# Patient Record
Sex: FEMALE | Race: WHITE | NOT HISPANIC OR LATINO | Employment: UNEMPLOYED | URBAN - NONMETROPOLITAN AREA
[De-identification: names, ages, dates, MRNs, and addresses within clinical notes are randomized per-mention and may not be internally consistent; named-entity substitution may affect disease eponyms.]

---

## 2019-01-13 ENCOUNTER — OFFICE VISIT (OUTPATIENT)
Dept: RETAIL CLINIC | Facility: CLINIC | Age: 49
End: 2019-01-13

## 2019-01-13 VITALS
OXYGEN SATURATION: 98 % | RESPIRATION RATE: 16 BRPM | DIASTOLIC BLOOD PRESSURE: 76 MMHG | TEMPERATURE: 97.2 F | SYSTOLIC BLOOD PRESSURE: 140 MMHG | HEART RATE: 76 BPM

## 2019-01-13 DIAGNOSIS — N95.1 PERIMENOPAUSAL SYMPTOM: ICD-10-CM

## 2019-01-13 DIAGNOSIS — H10.31 ACUTE CONJUNCTIVITIS OF RIGHT EYE, UNSPECIFIED ACUTE CONJUNCTIVITIS TYPE: ICD-10-CM

## 2019-01-13 DIAGNOSIS — J01.40 ACUTE PANSINUSITIS, RECURRENCE NOT SPECIFIED: Primary | ICD-10-CM

## 2019-01-13 PROCEDURE — 99213 OFFICE O/P EST LOW 20 MIN: CPT | Performed by: NURSE PRACTITIONER

## 2019-01-13 RX ORDER — VENLAFAXINE HYDROCHLORIDE 75 MG/1
75 CAPSULE, EXTENDED RELEASE ORAL DAILY
COMMUNITY

## 2019-01-13 RX ORDER — POLYMYXIN B SULFATE AND TRIMETHOPRIM 1; 10000 MG/ML; [USP'U]/ML
1 SOLUTION OPHTHALMIC EVERY 4 HOURS
Qty: 10 ML | Refills: 0 | Status: SHIPPED | OUTPATIENT
Start: 2019-01-13 | End: 2019-04-23

## 2019-01-13 RX ORDER — AMOXICILLIN AND CLAVULANATE POTASSIUM 875; 125 MG/1; MG/1
1 TABLET, FILM COATED ORAL 2 TIMES DAILY
Qty: 20 TABLET | Refills: 0 | Status: SHIPPED | OUTPATIENT
Start: 2019-01-13 | End: 2019-01-23

## 2019-01-13 NOTE — PATIENT INSTRUCTIONS
Drink plenty of fluids and rest  Follow up if symptoms worsen or persist     Sinusitis, Adult  Sinusitis is soreness and inflammation of your sinuses. Sinuses are hollow spaces in the bones around your face. Your sinuses are located:  · Around your eyes.  · In the middle of your forehead.  · Behind your nose.  · In your cheekbones.    Your sinuses and nasal passages are lined with a stringy fluid (mucus). Mucus normally drains out of your sinuses. When your nasal tissues become inflamed or swollen, the mucus can become trapped or blocked so air cannot flow through your sinuses. This allows bacteria, viruses, and funguses to grow, which leads to infection.  Sinusitis can develop quickly and last for 7?10 days (acute) or for more than 12 weeks (chronic). Sinusitis often develops after a cold.  What are the causes?  This condition is caused by anything that creates swelling in the sinuses or stops mucus from draining, including:  · Allergies.  · Asthma.  · Bacterial or viral infection.  · Abnormally shaped bones between the nasal passages.  · Nasal growths that contain mucus (nasal polyps).  · Narrow sinus openings.  · Pollutants, such as chemicals or irritants in the air.  · A foreign object stuck in the nose.  · A fungal infection. This is rare.    What increases the risk?  The following factors may make you more likely to develop this condition:  · Having allergies or asthma.  · Having had a recent cold or respiratory tract infection.  · Having structural deformities or blockages in your nose or sinuses.  · Having a weak immune system.  · Doing a lot of swimming or diving.  · Overusing nasal sprays.  · Smoking.    What are the signs or symptoms?  The main symptoms of this condition are pain and a feeling of pressure around the affected sinuses. Other symptoms include:  · Upper toothache.  · Earache.  · Headache.  · Bad breath.  · Decreased sense of smell and taste.  · A cough that may get worse at  night.  · Fatigue.  · Fever.  · Thick drainage from your nose. The drainage is often green and it may contain pus (purulent).  · Stuffy nose or congestion.  · Postnasal drip. This is when extra mucus collects in the throat or back of the nose.  · Swelling and warmth over the affected sinuses.  · Sore throat.  · Sensitivity to light.    How is this diagnosed?  This condition is diagnosed based on symptoms, a medical history, and a physical exam. To find out if your condition is acute or chronic, your health care provider may:  · Look in your nose for signs of nasal polyps.  · Tap over the affected sinus to check for signs of infection.  · View the inside of your sinuses using an imaging device that has a light attached (endoscope).    If your health care provider suspects that you have chronic sinusitis, you may also:  · Be tested for allergies.  · Have a sample of mucus taken from your nose (nasal culture) and checked for bacteria.  · Have a mucus sample examined to see if your sinusitis is related to an allergy.    If your sinusitis does not respond to treatment and it lasts longer than 8 weeks, you may have an MRI or CT scan to check your sinuses. These scans also help to determine how severe your infection is.  In rare cases, a bone biopsy may be done to rule out more serious types of fungal sinus disease.  How is this treated?  Treatment for sinusitis depends on the cause and whether your condition is chronic or acute. If a virus is causing your sinusitis, your symptoms will go away on their own within 10 days. You may be given medicines to relieve your symptoms, including:  · Topical nasal decongestants. They shrink swollen nasal passages and let mucus drain from your sinuses.  · Antihistamines. These drugs block inflammation that is triggered by allergies. This can help to ease swelling in your nose and sinuses.  · Topical nasal corticosteroids. These are nasal sprays that ease inflammation and swelling in  your nose and sinuses.  · Nasal saline washes. These rinses can help to get rid of thick mucus in your nose.    If your condition is caused by bacteria, you will be given an antibiotic medicine. If your condition is caused by a fungus, you will be given an antifungal medicine.  Surgery may be needed to correct underlying conditions, such as narrow nasal passages. Surgery may also be needed to remove polyps.  Follow these instructions at home:  Medicines  · Take, use, or apply over-the-counter and prescription medicines only as told by your health care provider. These may include nasal sprays.  · If you were prescribed an antibiotic medicine, take it as told by your health care provider. Do not stop taking the antibiotic even if you start to feel better.  Hydrate and Humidify  · Drink enough water to keep your urine clear or pale yellow. Staying hydrated will help to thin your mucus.  · Use a cool mist humidifier to keep the humidity level in your home above 50%.  · Inhale steam for 10-15 minutes, 3-4 times a day or as told by your health care provider. You can do this in the bathroom while a hot shower is running.  · Limit your exposure to cool or dry air.  Rest  · Rest as much as possible.  · Sleep with your head raised (elevated).  · Make sure to get enough sleep each night.  General instructions  · Apply a warm, moist washcloth to your face 3-4 times a day or as told by your health care provider. This will help with discomfort.  · Wash your hands often with soap and water to reduce your exposure to viruses and other germs. If soap and water are not available, use hand .  · Do not smoke. Avoid being around people who are smoking (secondhand smoke).  · Keep all follow-up visits as told by your health care provider. This is important.  Contact a health care provider if:  · You have a fever.  · Your symptoms get worse.  · Your symptoms do not improve within 10 days.  Get help right away if:  · You have a  severe headache.  · You have persistent vomiting.  · You have pain or swelling around your face or eyes.  · You have vision problems.  · You develop confusion.  · Your neck is stiff.  · You have trouble breathing.  This information is not intended to replace advice given to you by your health care provider. Make sure you discuss any questions you have with your health care provider.  Document Released: 12/18/2006 Document Revised: 08/13/2017 Document Reviewed: 10/12/2016  [employer] Patient Education © 2018 [employer]

## 2019-01-13 NOTE — PROGRESS NOTES
Chief Complaint   Patient presents with   • Sinusitis   • Conjunctivitis     Subjective   Antoinette Acosta is a 49 y.o. female who presents to the clinic today with complaints sinus pressure and pain and some conjunctivitis. She has been sick for 5 days with no improvements in symptoms.  She had cellulitis of her right eye about 6 months ago. She woke up 5 days ago with greenish discharge. Her eye is not getting any better, but she does not feel like she has cellulitis and eye is not swollen.   Sinusitis   This is a new problem. The current episode started in the past 7 days. The problem has been gradually worsening since onset. There has been no fever. The pain is moderate. Associated symptoms include congestion, coughing, ear pain, headaches, sinus pressure and a sore throat. Pertinent negatives include no chills, diaphoresis, hoarse voice, neck pain, shortness of breath, sneezing or swollen glands.   Conjunctivitis    The current episode started 5 to 7 days ago. The onset was sudden. The problem has been gradually worsening. The problem is moderate. Nothing relieves the symptoms. Nothing aggravates the symptoms. Associated symptoms include eye itching, congestion, ear pain, headaches, sore throat, cough, eye discharge and eye redness. Pertinent negatives include no fever, no decreased vision, no double vision, no photophobia, no hearing loss, no mouth sores, no rhinorrhea, no stridor, no swollen glands, no neck pain, no wheezing and no eye pain.         Current Outpatient Medications:   •  venlafaxine XR (EFFEXOR-XR) 75 MG 24 hr capsule, Take 75 mg by mouth Daily., Disp: , Rfl:   •  amoxicillin-clavulanate (AUGMENTIN) 875-125 MG per tablet, Take 1 tablet by mouth 2 (Two) Times a Day for 10 days., Disp: 20 tablet, Rfl: 0  •  trimethoprim-polymyxin b (POLYTRIM) 87557-6.1 UNIT/ML-% ophthalmic solution, Administer 1 drop to the right eye Every 4 (Four) Hours., Disp: 10 mL, Rfl: 0    Allergies:  Sulfur and  Codeine    Past Medical History:   Diagnosis Date   • Allergic    • Anxiety    • Arthritis    • Hypertension    • Sinusitis      Past Surgical History:   Procedure Laterality Date   • CARPAL TUNNEL RELEASE Bilateral    •  SECTION     • RECONSTRUCTION OF NOSE       Family History   Problem Relation Age of Onset   • Lung disease Mother      Social History     Tobacco Use   • Smoking status: Current Every Day Smoker     Packs/day: 1.00     Years: 0.75     Pack years: 0.75     Types: Cigarettes   • Smokeless tobacco: Never Used   Substance Use Topics   • Alcohol use: Yes     Comment: 2 weekly    • Drug use: No       Review of Systems  Review of Systems   Constitutional: Positive for fatigue. Negative for chills, diaphoresis and fever.   HENT: Positive for congestion, ear pain, postnasal drip, sinus pressure, sinus pain and sore throat. Negative for hearing loss, hoarse voice, mouth sores, rhinorrhea and sneezing.    Eyes: Positive for discharge, redness and itching. Negative for double vision, photophobia and pain.   Respiratory: Positive for cough. Negative for shortness of breath, wheezing and stridor.    Musculoskeletal: Negative for neck pain.   Neurological: Positive for headaches. Negative for dizziness.   Hematological: Negative for adenopathy.       Objective   /76 (BP Location: Left arm, Patient Position: Sitting, Cuff Size: Adult)   Pulse 76   Temp 97.2 °F (36.2 °C) (Tympanic)   Resp 16   LMP 2019   SpO2 98%       Physical Exam   Constitutional: She is oriented to person, place, and time. She appears well-developed and well-nourished.   HENT:   Head: Normocephalic and atraumatic.   Right Ear: Hearing, tympanic membrane, external ear and ear canal normal.   Left Ear: Hearing, tympanic membrane, external ear and ear canal normal.   Nose: Mucosal edema and rhinorrhea present. Right sinus exhibits maxillary sinus tenderness and frontal sinus tenderness. Left sinus exhibits no maxillary  sinus tenderness and no frontal sinus tenderness.   Mouth/Throat: Uvula is midline and mucous membranes are normal. Posterior oropharyngeal erythema present. No oropharyngeal exudate, posterior oropharyngeal edema or tonsillar abscesses. Tonsils are 1+ on the right. Tonsils are 1+ on the left. No tonsillar exudate.   Eyes: EOM and lids are normal. Pupils are equal, round, and reactive to light. Right conjunctiva is injected. Right conjunctiva has no hemorrhage. Left conjunctiva is not injected. Left conjunctiva has no hemorrhage.   Neck: Normal range of motion. Neck supple.   Cardiovascular: Normal rate, regular rhythm and normal heart sounds. Exam reveals no gallop and no friction rub.   No murmur heard.  Pulmonary/Chest: Effort normal and breath sounds normal. No stridor. No respiratory distress. She has no wheezes. She has no rales. She exhibits no tenderness.   Lymphadenopathy:        Head (right side): No submental, no submandibular, no tonsillar, no preauricular, no posterior auricular and no occipital adenopathy present.        Head (left side): No submental, no submandibular, no tonsillar, no preauricular, no posterior auricular and no occipital adenopathy present.     She has no cervical adenopathy.   Neurological: She is alert and oriented to person, place, and time.   Skin: Skin is warm and dry.   Psychiatric: She has a normal mood and affect.   Vitals reviewed.      Assessment/Plan     Antoinette was seen today for sinusitis and conjunctivitis.    Diagnoses and all orders for this visit:    Acute pansinusitis, recurrence not specified    Acute conjunctivitis of right eye, unspecified acute conjunctivitis type    Perimenopausal symptom  -     Ambulatory Referral to Gynecology    Other orders  -     trimethoprim-polymyxin b (POLYTRIM) 14504-2.1 UNIT/ML-% ophthalmic solution; Administer 1 drop to the right eye Every 4 (Four) Hours.  -     amoxicillin-clavulanate (AUGMENTIN) 875-125 MG per tablet; Take 1  tablet by mouth 2 (Two) Times a Day for 10 days.    Request referral for perimenopausal symptoms  Drink plenty of fluids and rest  Follow up if symptoms worsen or persist

## 2019-02-04 ENCOUNTER — OFFICE VISIT (OUTPATIENT)
Dept: OBSTETRICS AND GYNECOLOGY | Facility: CLINIC | Age: 49
End: 2019-02-04

## 2019-02-04 VITALS
HEIGHT: 67 IN | BODY MASS INDEX: 29.66 KG/M2 | SYSTOLIC BLOOD PRESSURE: 138 MMHG | DIASTOLIC BLOOD PRESSURE: 90 MMHG | WEIGHT: 189 LBS

## 2019-02-04 DIAGNOSIS — Z71.6 ENCOUNTER FOR SMOKING CESSATION COUNSELING: ICD-10-CM

## 2019-02-04 DIAGNOSIS — N92.0 MENORRHAGIA WITH REGULAR CYCLE: Primary | ICD-10-CM

## 2019-02-04 DIAGNOSIS — Z12.31 SCREENING MAMMOGRAM, ENCOUNTER FOR: ICD-10-CM

## 2019-02-04 PROCEDURE — 99202 OFFICE O/P NEW SF 15 MIN: CPT | Performed by: OBSTETRICS & GYNECOLOGY

## 2019-02-04 RX ORDER — BUPROPION HYDROCHLORIDE 150 MG/1
150 TABLET ORAL EVERY MORNING
Qty: 30 TABLET | Refills: 2 | Status: SHIPPED | OUTPATIENT
Start: 2019-02-04 | End: 2019-05-07

## 2019-02-04 NOTE — PROGRESS NOTES
Attempted to obtain health maintenance information, patient unable to provide answers for the following items Tdap, Colonoscopy, Pneumococcal Vaccine, Zoster Vaccine and annual exam. .

## 2019-02-04 NOTE — PROGRESS NOTES
"Subjective   Antoinette Acosta is a 49 y.o. female  YOB: 1970    Chief Complaint   Patient presents with   • Menstrual Problem     New pt to practice , here for evaluation of her irregular periods. Had pap this past summer, states normal.  Needs mammogram order and clinical breast exam.  Has some refills she wants.        48 yo  LMP 2019 presents due to menorrhagia. Patient reports her periods last 2 weeks and she changes pad every hour the first couple of days. Patient reports history of abnormal PAPs, last PAP smear was normal in summer in [place]. Patient reports that she smokes 1 ppd, occasional ETOH. Patient denies family history of ovarian, endometrial or breast cancer      Menorrhagia   This is a chronic problem. The current episode started more than 1 month ago. The problem has been waxing and waning. Pertinent negatives include no arthralgias, chest pain, chills, congestion, fever, neck pain or rash. Diaphoresis: 40 lbs in 2 years. Nothing aggravates the symptoms. She has tried NSAIDs for the symptoms. The treatment provided mild relief.       Allergies   Allergen Reactions   • Sulfur Angioedema   • Codeine Other (See Comments)     \"crazy\"       Past Medical History:   Diagnosis Date   • Allergic    • Anxiety    • Arthritis    • Hypertension    • Sinusitis        Family History   Problem Relation Age of Onset   • Lung disease Mother    • Breast cancer Neg Hx    • Ovarian cancer Neg Hx    • Colon cancer Neg Hx        Social History     Socioeconomic History   • Marital status:      Spouse name: Not on file   • Number of children: Not on file   • Years of education: Not on file   • Highest education level: Not on file   Social Needs   • Financial resource strain: Not on file   • Food insecurity - worry: Not on file   • Food insecurity - inability: Not on file   • Transportation needs - medical: Not on file   • Transportation needs - non-medical: Not on file   Occupational " History   • Not on file   Tobacco Use   • Smoking status: Current Every Day Smoker     Packs/day: 1.00     Years: 0.75     Pack years: 0.75     Types: Cigarettes   • Smokeless tobacco: Never Used   Substance and Sexual Activity   • Alcohol use: Yes     Comment: 2 weekly    • Drug use: No   • Sexual activity: Yes     Partners: Male     Comment:  had vasectomy   Other Topics Concern   • Not on file   Social History Narrative   • Not on file         Current Outpatient Medications:   •  Amoxicillin-Pot Clavulanate (AUGMENTIN PO), Take 1 tablet by mouth 2 (Two) Times a Day., Disp: , Rfl:   •  trimethoprim-polymyxin b (POLYTRIM) 94488-3.1 UNIT/ML-% ophthalmic solution, Administer 1 drop to the right eye Every 4 (Four) Hours., Disp: 10 mL, Rfl: 0  •  venlafaxine XR (EFFEXOR-XR) 75 MG 24 hr capsule, Take 75 mg by mouth Daily., Disp: , Rfl:   •  buPROPion XL (WELLBUTRIN XL) 150 MG 24 hr tablet, Take 1 tablet by mouth Every Morning., Disp: 30 tablet, Rfl: 2    Patient's last menstrual period was 2019 (exact date).    Sexual History:         Could not be calculated    Past Surgical History:   Procedure Laterality Date   • CARPAL TUNNEL RELEASE Bilateral    •  SECTION      x 3   • RECONSTRUCTION OF NOSE         Review of Systems   Constitutional: Positive for unexpected weight gain. Negative for activity change, chills, fever and unexpected weight loss. Diaphoresis: 40 lbs in 2 years.   HENT: Negative for congestion.    Cardiovascular: Negative for chest pain.   Gastrointestinal: Negative for blood in stool, constipation and diarrhea.   Endocrine: Negative for cold intolerance and heat intolerance.   Genitourinary: Positive for menorrhagia. Negative for dyspareunia, pelvic pain and vaginal discharge.   Musculoskeletal: Negative for arthralgias, back pain, neck pain and neck stiffness.   Skin: Negative for rash.   Neurological: Negative for dizziness and headache.   Psychiatric/Behavioral: Negative for sleep  "disturbance. The patient is not nervous/anxious.        Objective   Physical Exam   Constitutional: She is oriented to person, place, and time. She appears well-developed and well-nourished. No distress.   HENT:   Head: Normocephalic and atraumatic.   Eyes: EOM are normal.   Neck: Normal range of motion. Neck supple.   Cardiovascular: Normal rate and regular rhythm.   No murmur heard.  Pulmonary/Chest: Effort normal and breath sounds normal. Right breast exhibits no inverted nipple and no mass. Left breast exhibits no inverted nipple and no mass.   Abdominal: Soft. She exhibits no distension. There is no tenderness.   Musculoskeletal: Normal range of motion. She exhibits no edema.   Neurological: She is alert and oriented to person, place, and time.   Skin: Skin is warm and dry.   Psychiatric: She has a normal mood and affect. Her behavior is normal. Judgment normal.   Nursing note and vitals reviewed.        Vitals:    02/04/19 0828   BP: 138/90   BP Location: Right arm   Patient Position: Sitting   Cuff Size: Adult   Weight: 85.7 kg (189 lb)   Height: 170.2 cm (67\")       Antoinette was seen today for menstrual problem.    Diagnoses and all orders for this visit:    Menorrhagia with regular cycle  -     Follicle Stimulating Hormone  -     Estradiol  -     Luteinizing Hormone  -     CBC & Differential  -     Progesterone    Screening mammogram, encounter for  -     Mammo Screening Digital Tomosynthesis Bilateral With CAD; Future    Encounter for smoking cessation counseling  -     buPROPion XL (WELLBUTRIN XL) 150 MG 24 hr tablet; Take 1 tablet by mouth Every Morning.    -Return to clinic in 4 weeks for recheck on medications   -Labs collected with results to follow.   -Given referral for mammogram.  -Patient doesn't desire management at this time for menorrhagia.     Evette Mosher, DO       "

## 2019-02-05 LAB
BASOPHILS # BLD AUTO: 0.06 10*3/MM3 (ref 0–0.2)
BASOPHILS NFR BLD AUTO: 0.6 % (ref 0–2)
EOSINOPHIL # BLD AUTO: 0.39 10*3/MM3 (ref 0–0.7)
EOSINOPHIL NFR BLD AUTO: 4 % (ref 0–4)
ERYTHROCYTE [DISTWIDTH] IN BLOOD BY AUTOMATED COUNT: 12.8 % (ref 12–15)
ESTRADIOL SERPL-MCNC: 112.5 PG/ML
FSH SERPL-ACNC: 18.7 MIU/ML
HCT VFR BLD AUTO: 44.2 % (ref 37–47)
HGB BLD-MCNC: 14.8 G/DL (ref 12–16)
IMM GRANULOCYTES # BLD AUTO: 0.04 10*3/MM3 (ref 0–0.03)
IMM GRANULOCYTES NFR BLD AUTO: 0.4 % (ref 0–5)
LH SERPL-ACNC: 14.7 MIU/ML
LYMPHOCYTES # BLD AUTO: 3.06 10*3/MM3 (ref 0.72–4.86)
LYMPHOCYTES NFR BLD AUTO: 31.5 % (ref 15–45)
MCH RBC QN AUTO: 31.4 PG (ref 28–32)
MCHC RBC AUTO-ENTMCNC: 33.5 G/DL (ref 33–36)
MCV RBC AUTO: 93.6 FL (ref 82–98)
MONOCYTES # BLD AUTO: 0.48 10*3/MM3 (ref 0.19–1.3)
MONOCYTES NFR BLD AUTO: 4.9 % (ref 4–12)
NEUTROPHILS # BLD AUTO: 5.69 10*3/MM3 (ref 1.87–8.4)
NEUTROPHILS NFR BLD AUTO: 58.6 % (ref 39–78)
NRBC BLD AUTO-RTO: 0 /100 WBC (ref 0–0)
PLATELET # BLD AUTO: 203 10*3/MM3 (ref 130–400)
PROGEST SERPL-MCNC: 0.2 NG/ML
RBC # BLD AUTO: 4.72 10*6/MM3 (ref 4.2–5.4)
WBC # BLD AUTO: 9.72 10*3/MM3 (ref 4.8–10.8)

## 2019-02-11 ENCOUNTER — TRANSCRIBE ORDERS (OUTPATIENT)
Dept: ADMINISTRATIVE | Facility: HOSPITAL | Age: 49
End: 2019-02-11

## 2019-02-11 DIAGNOSIS — Z12.39 BREAST SCREENING: Primary | ICD-10-CM

## 2019-02-14 ENCOUNTER — HOSPITAL ENCOUNTER (OUTPATIENT)
Dept: MAMMOGRAPHY | Facility: HOSPITAL | Age: 49
Discharge: HOME | End: 2019-02-14
Admitting: OBSTETRICS & GYNECOLOGY

## 2019-02-14 DIAGNOSIS — Z12.31 SCREENING MAMMOGRAM, ENCOUNTER FOR: ICD-10-CM

## 2019-02-14 PROCEDURE — 77067 SCR MAMMO BI INCL CAD: CPT

## 2019-02-14 PROCEDURE — 77063 BREAST TOMOSYNTHESIS BI: CPT

## 2019-02-20 DIAGNOSIS — N63.0 BREAST NODULE: Primary | ICD-10-CM

## 2019-02-25 ENCOUNTER — HOSPITAL ENCOUNTER (OUTPATIENT)
Dept: ULTRASOUND IMAGING | Facility: HOSPITAL | Age: 49
Discharge: HOME | End: 2019-02-25
Admitting: OBSTETRICS & GYNECOLOGY

## 2019-02-25 PROCEDURE — 76642 ULTRASOUND BREAST LIMITED: CPT

## 2019-04-23 ENCOUNTER — OFFICE VISIT (OUTPATIENT)
Dept: OBSTETRICS AND GYNECOLOGY | Facility: CLINIC | Age: 49
End: 2019-04-23

## 2019-04-23 VITALS
HEIGHT: 67 IN | DIASTOLIC BLOOD PRESSURE: 80 MMHG | BODY MASS INDEX: 27.94 KG/M2 | SYSTOLIC BLOOD PRESSURE: 130 MMHG | WEIGHT: 178 LBS

## 2019-04-23 DIAGNOSIS — N95.1 PERIMENOPAUSAL: ICD-10-CM

## 2019-04-23 DIAGNOSIS — N91.2 AMENORRHEA: Primary | ICD-10-CM

## 2019-04-23 DIAGNOSIS — F17.200 SMOKER: ICD-10-CM

## 2019-04-23 PROCEDURE — 99213 OFFICE O/P EST LOW 20 MIN: CPT | Performed by: NURSE PRACTITIONER

## 2019-04-23 RX ORDER — MAGNESIUM CARB/ALUMINUM HYDROX 105-160MG
TABLET,CHEWABLE ORAL ONCE
COMMUNITY

## 2019-04-23 RX ORDER — AZELASTINE 1 MG/ML
2 SPRAY, METERED NASAL AS NEEDED
COMMUNITY
Start: 2016-10-28

## 2019-04-23 NOTE — PATIENT INSTRUCTIONS

## 2019-04-23 NOTE — PROGRESS NOTES
"Subjective   Antoinette Acosta is a 49 y.o. female.     Amenorrhea    Pt reports \"I am having cramping and no period since February\".         The following portions of the patient's history were reviewed and updated as appropriate: allergies, current medications, past family history, past medical history, past social history, past surgical history and problem list.    /80 (BP Location: Left arm, Patient Position: Sitting, Cuff Size: Adult)   Ht 170.2 cm (67\")   Wt 80.7 kg (178 lb)   LMP 02/26/2019   BMI 27.88 kg/m²     Review of Systems   Constitutional: Negative for activity change, appetite change, fatigue and fever.   Respiratory: Negative for apnea and shortness of breath.    Cardiovascular: Negative for chest pain and palpitations.   Gastrointestinal: Negative for abdominal distention, abdominal pain, constipation, diarrhea, nausea and vomiting.   Endocrine: Negative for cold intolerance and heat intolerance.   Genitourinary: Positive for menstrual problem (no period since February) and vaginal bleeding (spotting on Sunday and Monday). Negative for difficulty urinating, frequency, pelvic pain, vaginal discharge and vaginal pain.        Cramping irregularly   Neurological: Negative for headaches.   Psychiatric/Behavioral: Negative for agitation and sleep disturbance.       Objective   Physical Exam   Constitutional: She is oriented to person, place, and time. She appears well-developed.   Eyes: Right eye exhibits no discharge. Left eye exhibits no discharge.   Cardiovascular: Normal rate and regular rhythm.   Pulmonary/Chest: Effort normal and breath sounds normal.   Neurological: She is alert and oriented to person, place, and time.   Skin: Skin is warm.   Psychiatric: She has a normal mood and affect. Her behavior is normal. Judgment and thought content normal.   Vitals reviewed.      Assessment/Plan   Discussed bleeding, cramping, and previous lab results.   Labs indicated pt is not menopausal. "   Informed pt she is perimenopausal.   Reviewed S&S to report.   Pt to return for US and OV r/t cramping.   Patient's Body mass index is 27.88 kg/m². BMI is above normal parameters. Recommendations include: educational material.    RV US and OV, cramping/prn.   Antoinette was seen today for amenorrhea.    Diagnoses and all orders for this visit:    Amenorrhea    BMI 27.0-27.9,adult    Smoker    Perimenopausal

## 2019-05-07 ENCOUNTER — PROCEDURE VISIT (OUTPATIENT)
Dept: OBSTETRICS AND GYNECOLOGY | Facility: CLINIC | Age: 49
End: 2019-05-07

## 2019-05-07 ENCOUNTER — OFFICE VISIT (OUTPATIENT)
Dept: OBSTETRICS AND GYNECOLOGY | Facility: CLINIC | Age: 49
End: 2019-05-07

## 2019-05-07 VITALS
BODY MASS INDEX: 26.84 KG/M2 | WEIGHT: 171 LBS | DIASTOLIC BLOOD PRESSURE: 90 MMHG | SYSTOLIC BLOOD PRESSURE: 130 MMHG | HEIGHT: 67 IN

## 2019-05-07 DIAGNOSIS — F17.200 SMOKER: ICD-10-CM

## 2019-05-07 DIAGNOSIS — N93.8 DUB (DYSFUNCTIONAL UTERINE BLEEDING): Primary | ICD-10-CM

## 2019-05-07 DIAGNOSIS — Z12.11 SCREENING FOR MALIGNANT NEOPLASM OF COLON: ICD-10-CM

## 2019-05-07 PROBLEM — M16.11 PRIMARY OSTEOARTHRITIS OF RIGHT HIP: Status: ACTIVE | Noted: 2017-01-16

## 2019-05-07 PROBLEM — N94.3 PMS (PREMENSTRUAL SYNDROME): Status: ACTIVE | Noted: 2017-05-25

## 2019-05-07 PROBLEM — M79.18 MYOFASCIAL PAIN SYNDROME: Status: ACTIVE | Noted: 2017-01-16

## 2019-05-07 PROBLEM — M46.1 SACROILIITIS (HCC): Status: ACTIVE | Noted: 2017-01-05

## 2019-05-07 PROBLEM — M53.3 DISORDER OF SACRUM: Status: ACTIVE | Noted: 2017-01-16

## 2019-05-07 PROBLEM — G89.29 CHRONIC HIP PAIN: Status: ACTIVE | Noted: 2017-01-05

## 2019-05-07 PROBLEM — M25.559 CHRONIC HIP PAIN: Status: ACTIVE | Noted: 2017-01-05

## 2019-05-07 PROCEDURE — 76830 TRANSVAGINAL US NON-OB: CPT | Performed by: OBSTETRICS & GYNECOLOGY

## 2019-05-07 PROCEDURE — 99213 OFFICE O/P EST LOW 20 MIN: CPT | Performed by: NURSE PRACTITIONER

## 2019-05-07 NOTE — PROGRESS NOTES
"Subjective   Antoinette Acosta is a 49 y.o. female.     Follow up  Irregular bleeding  US today        The following portions of the patient's history were reviewed and updated as appropriate: allergies, current medications, past family history, past medical history, past social history, past surgical history and problem list.    /90   Ht 170.2 cm (67\")   Wt 77.6 kg (171 lb)   LMP 04/28/2019 (Exact Date)   BMI 26.78 kg/m²     Review of Systems   Constitutional: Negative for activity change, appetite change, fatigue and fever.   Respiratory: Negative for apnea and shortness of breath.    Cardiovascular: Negative for chest pain and palpitations.   Gastrointestinal: Negative for abdominal distention, abdominal pain, constipation, diarrhea, nausea and vomiting.   Endocrine: Negative for cold intolerance and heat intolerance.   Genitourinary: Positive for menstrual problem (last normal period February, spotting on Easter April 21st ). Negative for difficulty urinating, frequency, pelvic pain, vaginal discharge and vaginal pain.   Neurological: Negative for headaches.   Psychiatric/Behavioral: Negative for agitation and sleep disturbance.       Objective   Physical Exam   Constitutional: She is oriented to person, place, and time. She appears well-developed.   Eyes: Right eye exhibits no discharge. Left eye exhibits no discharge.   Cardiovascular: Normal rate and regular rhythm.   Pulmonary/Chest: Effort normal and breath sounds normal.   Neurological: She is alert and oriented to person, place, and time.   Skin: Skin is warm.   Psychiatric: She has a normal mood and affect. Her behavior is normal. Judgment and thought content normal.   Vitals reviewed.      Assessment/Plan   Discussed US with pt.   US indicates anteverted uterus, 0.85 cm endometrial thickness, normal ovaries.    Discussed patient's bleeding pattern since last visit, and previous labs, pt is perimenopausal.  Patient declines endometrial biopsy " today.  Repeat ultrasound in 6 months.  Inform patient a endometrial biopsy will be collected at next visit if amenorrhea continues and endometrial thickness remains the same or greater.    Patient reports she has not yet had a colonoscopy.  Referral for colonoscopy submitted.    Patient's Body mass index is 26.78 kg/m². BMI is within normal parameters. No follow-up required..    RV US and OV, 6 months/prn.   Diagnoses and all orders for this visit:    DUB (dysfunctional uterine bleeding)    BMI 26.0-26.9,adult    Smoker    Screening for malignant neoplasm of colon  -     Ambulatory Referral For Screening Colonoscopy

## 2019-05-21 NOTE — PROGRESS NOTES
"Chief Complaint   Patient presents with   • Colonoscopy     colon screening is constipated all her life       PCP: Ana Cook APRN  REFER: Marilyn Gomez A*    Subjective     HPI    Intermittent lower abdominal pain, described as a pressure.  Having a BM will relieve pain. History of constipation for \"whole life.\"  Bowels will go up to 10 days without moving.  She does not take anything to facilitate BM.  Miralax in past without relief.  Eating disorder as teenager, reports she consumed 100 laxatives per day.  Weight loss attributed to starting keto diet.  No signs of GI blood loss.     Past Medical History:   Diagnosis Date   • Allergic    • Anxiety    • Arthritis    • Hypertension    • Mitral valve prolapse    • Sinusitis      Past Surgical History:   Procedure Laterality Date   • CARPAL TUNNEL RELEASE Bilateral    •  SECTION      x 3   • RECONSTRUCTION OF NOSE       Outpatient Medications Marked as Taking for the 19 encounter (Office Visit) with Jj Christiansen APRN   Medication Sig Dispense Refill   • azelastine (ASTELIN) 0.1 % nasal spray 2 sprays into the nostril(s) as directed by provider As Needed.     • Cholecalciferol (VITAMIN D3) 5000 units capsule capsule Take 5,000 Units by mouth Daily.     • magnesium citrate 1.745 GM/30ML solution solution Take  by mouth 1 (One) Time.     • Misc Natural Products (COLON CLEANSE PO) Take  by mouth.     • Multiple Vitamin (MULTI-VITAMIN DAILY PO) Take  by mouth.     • Potassium 75 MG tablet Take  by mouth.     • Urine Glucose-Ketones Test (KETO-DIASTIX VI) by In Vitro route.     • venlafaxine XR (EFFEXOR-XR) 75 MG 24 hr capsule Take 75 mg by mouth Daily.       Allergies   Allergen Reactions   • Sulfur Angioedema   • Codeine Other (See Comments)     \"crazy\"     Social History     Socioeconomic History   • Marital status:      Spouse name: Not on file   • Number of children: Not on file   • Years of education: Not on file   • Highest " education level: Not on file   Tobacco Use   • Smoking status: Current Every Day Smoker     Packs/day: 1.00     Years: 30.00     Pack years: 30.00     Types: Cigarettes   • Smokeless tobacco: Never Used   Substance and Sexual Activity   • Alcohol use: Yes     Comment: 2 weekly    • Drug use: No   • Sexual activity: Yes     Partners: Male     Comment:  had vasectomy     Family History   Problem Relation Age of Onset   • Lung disease Mother    • Breast cancer Neg Hx    • Ovarian cancer Neg Hx    • Colon cancer Neg Hx    • Uterine cancer Neg Hx    • Colon polyps Neg Hx      Review of Systems   Constitutional: Negative for fatigue, fever and unexpected weight change.   HENT: Negative for hearing loss, sore throat and voice change.    Eyes: Negative for visual disturbance.   Respiratory: Negative for cough, shortness of breath and wheezing.    Cardiovascular: Negative for chest pain and palpitations.   Gastrointestinal: Positive for abdominal pain and constipation. Negative for blood in stool and vomiting.   Endocrine: Negative for polydipsia and polyuria.   Genitourinary: Negative for difficulty urinating, dysuria, hematuria and urgency.   Musculoskeletal: Negative for joint swelling and myalgias.   Skin: Negative for color change, rash and wound.   Neurological: Negative for dizziness, tremors, seizures and syncope.   Hematological: Does not bruise/bleed easily.   Psychiatric/Behavioral: Negative for agitation and confusion. The patient is not nervous/anxious.      Objective   Vitals:    05/22/19 0839   BP: 130/80   Pulse: 70   Temp: 97 °F (36.1 °C)   SpO2: 98%     Physical Exam   Constitutional: She is oriented to person, place, and time. She appears well-developed and well-nourished. She is cooperative.   HENT:   Head: Normocephalic and atraumatic.   Eyes: Conjunctivae are normal. Pupils are equal, round, and reactive to light. No scleral icterus.   Neck: Normal range of motion. Neck supple. No JVD present. No  thyroid mass and no thyromegaly present.   Cardiovascular: Normal rate, regular rhythm and normal heart sounds. Exam reveals no gallop and no friction rub.   No murmur heard.  Pulmonary/Chest: Effort normal and breath sounds normal. No accessory muscle usage. No respiratory distress. She has no wheezes. She has no rales.   Abdominal: Soft. Normal appearance and bowel sounds are normal. She exhibits no distension, no ascites and no mass. There is no hepatosplenomegaly. There is no tenderness. There is no rebound and no guarding.   Musculoskeletal: Normal range of motion. She exhibits no edema or tenderness.     Vascular Status -  Her right foot exhibits normal foot vasculature  and no edema. Her left foot exhibits normal foot vasculature  and no edema.  Lymphadenopathy:     She has no cervical adenopathy.   Neurological: She is alert and oriented to person, place, and time. She has normal strength. Gait normal.   Skin: Skin is warm, dry and intact. No rash noted.     Imaging Results (most recent)     None        Body mass index is 27.25 kg/m².    Assessment/Plan   Antoinette was seen today for colonoscopy.    Diagnoses and all orders for this visit:    Lower abdominal pain  -     Case Request; Standing  -     Implement Anesthesia Orders Day of Procedure; Standing  -     Obtain Informed Consent; Standing  -     Case Request    Other orders  -     polyethylene glycol (GoLYTELY) 236 g solution; Take 3,785 mL by mouth 1 (One) Time for 1 dose. Take as directed      COLONOSCOPY WITH ANESTHESIA (N/A)    Recommend daily use of Miralax, adjust as needed  Educated on 2 day prep  Continue water intake    All risks, benefits, alternatives, and indications of colonoscopy procedure have been discussed with the patient. Risks to include perforation of the colon requiring possible surgery or colostomy, risk of bleeding from biopsies or removal of colon tissue, possibility of missing a colon polyp or cancer, or adverse drug reaction.   Benefits to include the diagnosis and management of disease of the colon and rectum. Alternatives to include barium enema, radiographic evaluation, lab testing or no intervention. She verbalizes understanding and agrees.     Patient's Body mass index is 27.25 kg/m². BMI is above normal parameters. Recommendations include: no follow up.      There are no Patient Instructions on file for this visit.

## 2019-05-22 ENCOUNTER — OFFICE VISIT (OUTPATIENT)
Dept: GASTROENTEROLOGY | Facility: CLINIC | Age: 49
End: 2019-05-22

## 2019-05-22 VITALS
SYSTOLIC BLOOD PRESSURE: 130 MMHG | HEART RATE: 70 BPM | BODY MASS INDEX: 27.31 KG/M2 | HEIGHT: 67 IN | WEIGHT: 174 LBS | TEMPERATURE: 97 F | OXYGEN SATURATION: 98 % | DIASTOLIC BLOOD PRESSURE: 80 MMHG

## 2019-05-22 DIAGNOSIS — R10.30 LOWER ABDOMINAL PAIN: Primary | ICD-10-CM

## 2019-05-22 PROCEDURE — 99214 OFFICE O/P EST MOD 30 MIN: CPT | Performed by: NURSE PRACTITIONER

## 2019-05-27 ENCOUNTER — OFFICE VISIT (OUTPATIENT)
Dept: RETAIL CLINIC | Facility: CLINIC | Age: 49
End: 2019-05-27

## 2019-05-27 VITALS
TEMPERATURE: 98.7 F | OXYGEN SATURATION: 96 % | RESPIRATION RATE: 20 BRPM | WEIGHT: 175.4 LBS | DIASTOLIC BLOOD PRESSURE: 80 MMHG | HEART RATE: 76 BPM | BODY MASS INDEX: 27.47 KG/M2 | SYSTOLIC BLOOD PRESSURE: 115 MMHG

## 2019-05-27 DIAGNOSIS — H10.021 PINK EYE DISEASE OF RIGHT EYE: Primary | ICD-10-CM

## 2019-05-27 PROCEDURE — 99213 OFFICE O/P EST LOW 20 MIN: CPT | Performed by: NURSE PRACTITIONER

## 2019-05-27 RX ORDER — TOBRAMYCIN 3 MG/ML
1 SOLUTION/ DROPS OPHTHALMIC EVERY 6 HOURS SCHEDULED
Qty: 5 ML | Refills: 0 | Status: SHIPPED | OUTPATIENT
Start: 2019-05-27

## 2019-05-27 NOTE — PATIENT INSTRUCTIONS
Bacterial Conjunctivitis  Bacterial conjunctivitis is an infection of your conjunctiva. This is the clear membrane that covers the white part of your eye and the inner surface of your eyelid. This condition can make your eye:  · Red or pink.  · Itchy.    This condition is caused by bacteria. This condition spreads very easily from person to person (is contagious) and from one eye to the other eye.  Follow these instructions at home:  Medicines  · Take or apply your antibiotic medicine as told by your doctor. Do not stop taking or applying the antibiotic even if you start to feel better.  · Take or apply over-the-counter and prescription medicines only as told by your doctor.  · Do not touch your eyelid with the eye drop bottle or the ointment tube.  Managing discomfort  · Wipe any fluid from your eye with a warm, wet washcloth or a cotton ball.  · Place a cool, clean washcloth on your eye. Do this for 10-20 minutes, 3-4 times per day.  General instructions  · Do not wear contact lenses until the irritation is gone. Wear glasses until your doctor says it is okay to wear contacts.  · Do not wear eye makeup until your symptoms are gone. Throw away any old makeup.  · Change or wash your pillowcase every day.  · Do not share towels or washcloths with anyone.  · Wash your hands often with soap and water. Use paper towels to dry your hands.  · Do not touch or rub your eyes.  · Do not drive or use heavy machinery if your vision is blurry.  Contact a doctor if:  · You have a fever.  · Your symptoms do not get better after 10 days.  Get help right away if:  · You have a fever and your symptoms suddenly get worse.  · You have very bad pain when you move your eye.  · Your face:  ? Hurts.  ? Is red.  ? Is swollen.  · You have sudden loss of vision.  This information is not intended to replace advice given to you by your health care provider. Make sure you discuss any questions you have with your health care provider.  Document  Released: 09/26/2009 Document Revised: 05/25/2017 Document Reviewed: 09/29/2016  [employer] Patient Education © 2019 [employer]

## 2019-05-27 NOTE — PROGRESS NOTES
Subjective   Antoinette Acosta is a 49 y.o. female here for pink eye.     Has had pink eye before, feels this is what's going on in right eye; in the past it turned into cellulitis.      Conjunctivitis    The current episode started 3 to 5 days ago. The onset was gradual. The problem occurs continuously. The problem has been gradually worsening. The problem is mild. Associated symptoms include eye itching, constipation (chronic, having C-scope this Friday), eye discharge (sticky, eye stuck together this AM when she awoke) and eye redness. Pertinent negatives include no fever, no decreased vision, no double vision, no photophobia, no abdominal pain, no diarrhea, no nausea, no vomiting, no congestion, no ear discharge, no ear pain, no headaches, no hearing loss, no mouth sores, no rhinorrhea, no sore throat, no stridor, no swollen glands, no muscle aches, no cough, no URI, no wheezing, no rash and no eye pain.       The following portions of the patient's history were reviewed and updated as appropriate: allergies, current medications, past family history, past medical history, past social history, past surgical history and problem list.    Review of Systems   Constitutional: Negative for activity change, appetite change, fatigue and fever.   HENT: Negative for congestion, ear discharge, ear pain, hearing loss, mouth sores, rhinorrhea, sore throat and trouble swallowing.    Eyes: Positive for discharge (sticky, eye stuck together this AM when she awoke), redness and itching. Negative for double vision, photophobia and pain.   Respiratory: Negative for cough, chest tightness, wheezing and stridor.    Cardiovascular: Negative for leg swelling.   Gastrointestinal: Positive for constipation (chronic, having C-scope this Friday). Negative for abdominal pain, diarrhea, nausea and vomiting.   Musculoskeletal: Negative for myalgias.   Skin: Negative for rash.   Allergic/Immunologic: Negative for food allergies.    Neurological: Negative for headaches.   Hematological: Negative for adenopathy.       Objective   Physical Exam   Constitutional: She is oriented to person, place, and time. She appears well-developed and well-nourished. No distress.   HENT:   Head: Normocephalic and atraumatic.   Right Ear: External ear normal.   Left Ear: External ear normal.   Nose: Nose normal.   Mouth/Throat: Oropharynx is clear and moist. No oropharyngeal exudate.   TM's WNL   Eyes: EOM are normal. Pupils are equal, round, and reactive to light. Right eye exhibits no discharge. Left eye exhibits no discharge. No scleral icterus.   Right sclera injected, mild lid swelling without erythema.  No evidence of cellulitis.  No drainage seen from eye at this time.  No obvious FB noted.   Neck: Normal range of motion. Neck supple. No JVD present. No tracheal deviation present. No thyromegaly present.   Cardiovascular: Normal rate, regular rhythm and normal heart sounds.   Pulmonary/Chest: Effort normal and breath sounds normal. No stridor. No respiratory distress. She has no wheezes. She has no rales.   Abdominal: Soft. She exhibits no distension. There is no tenderness.   Musculoskeletal: Normal range of motion. She exhibits no edema, tenderness or deformity.   Lymphadenopathy:     She has no cervical adenopathy.   Neurological: She is alert and oriented to person, place, and time. She exhibits normal muscle tone. Coordination normal.   Skin: Skin is warm and dry. No rash noted. She is not diaphoretic. No erythema. No pallor.   Psychiatric: She has a normal mood and affect. Her behavior is normal. Judgment and thought content normal.   Nursing note and vitals reviewed.      Assessment/Plan   Antoinette was seen today for conjunctivitis.    Diagnoses and all orders for this visit:    Pink eye disease of right eye    Other orders  -     tobramycin (TOBREX) 0.3 % solution ophthalmic solution; Administer 1 drop to the right eye Every 6 (Six) Hours.          I've asked her to use the drops q 2 hours while awake today only. I've also asked her to F/U with her eye doctor if eye is not significantly improved in 24-48 hours.

## 2019-05-29 ENCOUNTER — OFFICE VISIT (OUTPATIENT)
Dept: FAMILY MEDICINE CLINIC | Age: 49
End: 2019-05-29
Payer: COMMERCIAL

## 2019-05-29 ENCOUNTER — TELEPHONE (OUTPATIENT)
Dept: GASTROENTEROLOGY | Facility: CLINIC | Age: 49
End: 2019-05-29

## 2019-05-29 VITALS
RESPIRATION RATE: 20 BRPM | BODY MASS INDEX: 26.56 KG/M2 | TEMPERATURE: 97.9 F | OXYGEN SATURATION: 98 % | HEART RATE: 74 BPM | HEIGHT: 68 IN | DIASTOLIC BLOOD PRESSURE: 90 MMHG | WEIGHT: 175.25 LBS | SYSTOLIC BLOOD PRESSURE: 132 MMHG

## 2019-05-29 DIAGNOSIS — Z13.220 LIPID SCREENING: ICD-10-CM

## 2019-05-29 DIAGNOSIS — F41.9 ANXIETY AND DEPRESSION: Primary | ICD-10-CM

## 2019-05-29 DIAGNOSIS — Z72.0 SMOKING TRYING TO QUIT: ICD-10-CM

## 2019-05-29 DIAGNOSIS — F32.A ANXIETY AND DEPRESSION: Primary | ICD-10-CM

## 2019-05-29 PROCEDURE — 99213 OFFICE O/P EST LOW 20 MIN: CPT | Performed by: NURSE PRACTITIONER

## 2019-05-29 RX ORDER — MAGNESIUM CARB/ALUMINUM HYDROX 105-160MG
TABLET,CHEWABLE ORAL
COMMUNITY
End: 2022-02-02 | Stop reason: ALTCHOICE

## 2019-05-29 RX ORDER — VENLAFAXINE HYDROCHLORIDE 75 MG/1
75 CAPSULE, EXTENDED RELEASE ORAL DAILY
Qty: 30 CAPSULE | Refills: 5 | Status: SHIPPED | OUTPATIENT
Start: 2019-05-29 | End: 2020-03-13 | Stop reason: SDUPTHER

## 2019-05-29 RX ORDER — POLYETHYLENE GLYCOL-3350 AND ELECTROLYTES 236; 6.74; 5.86; 2.97; 22.74 G/274.31G; G/274.31G; G/274.31G; G/274.31G; G/274.31G
POWDER, FOR SOLUTION ORAL
Refills: 0 | COMMUNITY
Start: 2019-05-22 | End: 2022-01-07 | Stop reason: ALTCHOICE

## 2019-05-29 RX ORDER — TOBRAMYCIN 3 MG/ML
1 SOLUTION/ DROPS OPHTHALMIC
COMMUNITY
Start: 2019-05-27 | End: 2022-01-19

## 2019-05-29 RX ORDER — VENLAFAXINE HYDROCHLORIDE 75 MG/1
75 CAPSULE, EXTENDED RELEASE ORAL DAILY
Qty: 30 CAPSULE | Refills: 5 | Status: SHIPPED | OUTPATIENT
Start: 2019-05-29 | End: 2019-05-29 | Stop reason: SDUPTHER

## 2019-05-29 ASSESSMENT — PATIENT HEALTH QUESTIONNAIRE - PHQ9
2. FEELING DOWN, DEPRESSED OR HOPELESS: 1
1. LITTLE INTEREST OR PLEASURE IN DOING THINGS: 1
SUM OF ALL RESPONSES TO PHQ QUESTIONS 1-9: 2
SUM OF ALL RESPONSES TO PHQ QUESTIONS 1-9: 2
SUM OF ALL RESPONSES TO PHQ9 QUESTIONS 1 & 2: 2

## 2019-05-29 ASSESSMENT — ENCOUNTER SYMPTOMS: SHORTNESS OF BREATH: 0

## 2019-05-29 NOTE — TELEPHONE ENCOUNTER
Patient cxd procedure for 05/31/2019 ay 6:45am   Due to going out of town.     Didn't want to reschedule at this time.     Sent letter to pcp

## 2019-05-29 NOTE — PROGRESS NOTES
TINO Mary   azelastine (ASTELIN) 0.1 % nasal spray 2 sprays by Nasal route 2 times daily Use in each nostril as directed Yes TINO Peralta   Probiotic Product (PROBIOTIC DAILY PO) Take  by mouth.  Yes Historical Provider, MD     Allergies   Allergen Reactions    Codeine     Sulfa Antibiotics      Past Surgical History:   Procedure Laterality Date    CARPAL TUNNEL RELEASE Right      SECTION      NASAL FRACTURE SURGERY      URETHRA SURGERY       Family History   Problem Relation Age of Onset    Asthma Mother     Asthma Brother     Heart Disease Paternal Aunt     Asthma Child      Social History     Socioeconomic History    Marital status:      Spouse name: Not on file    Number of children: Not on file    Years of education: Not on file    Highest education level: Not on file   Occupational History    Not on file   Social Needs    Financial resource strain: Not on file    Food insecurity:     Worry: Not on file     Inability: Not on file    Transportation needs:     Medical: Not on file     Non-medical: Not on file   Tobacco Use    Smoking status: Current Every Day Smoker     Packs/day: 0.50     Years: 20.00     Pack years: 10.00     Types: Cigarettes     Start date: 1989    Smokeless tobacco: Never Used   Substance and Sexual Activity    Alcohol use: Yes     Comment: rare    Drug use: No    Sexual activity: Not on file   Lifestyle    Physical activity:     Days per week: Not on file     Minutes per session: Not on file    Stress: Not on file   Relationships    Social connections:     Talks on phone: Not on file     Gets together: Not on file     Attends [employer] service: Not on file     Active member of club or organization: Not on file     Attends meetings of clubs or organizations: Not on file     Relationship status: Not on file    Intimate partner violence:     Fear of current or ex partner: Not on file     Emotionally abused: Not on file     Physically abused: Not on file     Forced sexual activity: Not on file   Other Topics Concern    Not on file   Social History Narrative    Not on file       Review of Systems   Constitutional: Negative for unexpected weight change. Respiratory: Negative for shortness of breath. Psychiatric/Behavioral: The patient is nervous/anxious. OBJECTIVE:    Physical Exam   Constitutional: She is oriented to person, place, and time. She appears well-developed and well-nourished. HENT:   Head: Normocephalic and atraumatic. Right Ear: External ear normal.   Left Ear: External ear normal.   Mouth/Throat: Oropharynx is clear and moist. No oropharyngeal exudate. Eyes: Pupils are equal, round, and reactive to light. Conjunctivae and EOM are normal.   Neck: Normal range of motion. Neck supple. No tracheal deviation present. No thyromegaly present. Cardiovascular: Normal rate, regular rhythm and normal heart sounds. Pulmonary/Chest: Effort normal and breath sounds normal.   Neurological: She is alert and oriented to person, place, and time. Skin: Skin is warm and dry. Psychiatric: She has a normal mood and affect. Her speech is normal and behavior is normal. Judgment and thought content normal. Her mood appears not anxious. Her affect is not angry. Cognition and memory are normal. She does not exhibit a depressed mood. Nursing note and vitals reviewed. BP (!) 132/90 (Site: Left Upper Arm, Position: Sitting, Cuff Size: Large Adult)   Pulse 74   Temp 97.9 °F (36.6 °C) (Temporal)   Resp 20   Ht 5' 7.5\" (1.715 m)   Wt 175 lb 4 oz (79.5 kg)   LMP 05/27/2019 (Exact Date)   SpO2 98%   BMI 27.04 kg/m²      ASSESSMENT:      ICD-10-CM    1. Anxiety and depression F41.9 venlafaxine (EFFEXOR XR) 75 MG extended release capsule    F32.9 Urinalysis Reflex to Culture     CBC Auto Differential     Comprehensive Metabolic Panel     Lipid Panel     TSH without Reflex     T4, Free   2.  Smoking trying to quit Z72.0    3. Lipid screening Z13.220 Lipid Panel       PLAN:    Nhan Hutchins: Medication Refill (Patient presents for medication refills and check up. Patient moved back from [address] [facility])  Fasting lab ordered  Continue counseling  Continue effexor and pt to notify me if she wishes to increase dose. Diagnosis and orders for this visit are above. Please note that this chart was generated using dragon dictationsoftware. Although every effort was made to ensure the accuracy of this automated transcription, some errors in transcription may have occurred.

## 2019-07-22 DIAGNOSIS — Z13.220 LIPID SCREENING: ICD-10-CM

## 2019-07-22 DIAGNOSIS — F41.9 ANXIETY AND DEPRESSION: ICD-10-CM

## 2019-07-22 DIAGNOSIS — F32.A ANXIETY AND DEPRESSION: ICD-10-CM

## 2019-07-22 LAB
ALBUMIN SERPL-MCNC: 3.9 G/DL (ref 3.5–5.2)
ALP BLD-CCNC: 42 U/L (ref 35–104)
ALT SERPL-CCNC: 8 U/L (ref 5–33)
ANION GAP SERPL CALCULATED.3IONS-SCNC: 13 MMOL/L (ref 7–19)
AST SERPL-CCNC: 14 U/L (ref 5–32)
BASOPHILS ABSOLUTE: 0.1 K/UL (ref 0–0.2)
BASOPHILS RELATIVE PERCENT: 0.9 % (ref 0–1)
BILIRUB SERPL-MCNC: <0.2 MG/DL (ref 0.2–1.2)
BILIRUBIN URINE: NEGATIVE
BLOOD, URINE: NEGATIVE
BUN BLDV-MCNC: 10 MG/DL (ref 6–20)
CALCIUM SERPL-MCNC: 9 MG/DL (ref 8.6–10)
CHLORIDE BLD-SCNC: 104 MMOL/L (ref 98–111)
CHOLESTEROL, TOTAL: 171 MG/DL (ref 160–199)
CLARITY: CLEAR
CO2: 24 MMOL/L (ref 22–29)
COLOR: YELLOW
CREAT SERPL-MCNC: 0.7 MG/DL (ref 0.5–0.9)
EOSINOPHILS ABSOLUTE: 0.3 K/UL (ref 0–0.6)
EOSINOPHILS RELATIVE PERCENT: 4 % (ref 0–5)
GFR NON-AFRICAN AMERICAN: >60
GLUCOSE BLD-MCNC: 83 MG/DL (ref 74–109)
GLUCOSE URINE: NEGATIVE MG/DL
HCT VFR BLD CALC: 43.4 % (ref 37–47)
HDLC SERPL-MCNC: 62 MG/DL (ref 65–121)
HEMOGLOBIN: 14.4 G/DL (ref 12–16)
KETONES, URINE: NEGATIVE MG/DL
LDL CHOLESTEROL CALCULATED: 99 MG/DL
LEUKOCYTE ESTERASE, URINE: NEGATIVE
LYMPHOCYTES ABSOLUTE: 3.2 K/UL (ref 1.1–4.5)
LYMPHOCYTES RELATIVE PERCENT: 39.6 % (ref 20–40)
MCH RBC QN AUTO: 32.5 PG (ref 27–31)
MCHC RBC AUTO-ENTMCNC: 33.2 G/DL (ref 33–37)
MCV RBC AUTO: 98 FL (ref 81–99)
MONOCYTES ABSOLUTE: 0.5 K/UL (ref 0–0.9)
MONOCYTES RELATIVE PERCENT: 6.7 % (ref 0–10)
NEUTROPHILS ABSOLUTE: 3.9 K/UL (ref 1.5–7.5)
NEUTROPHILS RELATIVE PERCENT: 48.6 % (ref 50–65)
NITRITE, URINE: NEGATIVE
PDW BLD-RTO: 13.2 % (ref 11.5–14.5)
PH UA: 6.5 (ref 5–8)
PLATELET # BLD: 202 K/UL (ref 130–400)
PMV BLD AUTO: 12.2 FL (ref 9.4–12.3)
POTASSIUM SERPL-SCNC: 4.5 MMOL/L (ref 3.5–5)
PROTEIN UA: NEGATIVE MG/DL
RBC # BLD: 4.43 M/UL (ref 4.2–5.4)
SODIUM BLD-SCNC: 141 MMOL/L (ref 136–145)
SPECIFIC GRAVITY UA: 1.01 (ref 1–1.03)
T4 FREE: 0.9 NG/DL (ref 0.9–1.7)
TOTAL PROTEIN: 6.9 G/DL (ref 6.6–8.7)
TRIGL SERPL-MCNC: 51 MG/DL (ref 0–149)
TSH SERPL DL<=0.05 MIU/L-ACNC: 1.23 UIU/ML (ref 0.27–4.2)
URINE REFLEX TO CULTURE: NORMAL
UROBILINOGEN, URINE: 0.2 E.U./DL
WBC # BLD: 8 K/UL (ref 4.8–10.8)

## 2020-03-13 RX ORDER — VENLAFAXINE HYDROCHLORIDE 75 MG/1
75 CAPSULE, EXTENDED RELEASE ORAL DAILY
Qty: 30 CAPSULE | Refills: 0 | Status: SHIPPED | OUTPATIENT
Start: 2020-03-13 | End: 2020-05-11 | Stop reason: SDUPTHER

## 2020-05-11 RX ORDER — VENLAFAXINE HYDROCHLORIDE 75 MG/1
75 CAPSULE, EXTENDED RELEASE ORAL DAILY
Qty: 30 CAPSULE | Refills: 0 | Status: SHIPPED | OUTPATIENT
Start: 2020-05-11 | End: 2020-06-22 | Stop reason: SDUPTHER

## 2020-06-22 ENCOUNTER — VIRTUAL VISIT (OUTPATIENT)
Dept: FAMILY MEDICINE CLINIC | Age: 50
End: 2020-06-22
Payer: COMMERCIAL

## 2020-06-22 PROCEDURE — 99213 OFFICE O/P EST LOW 20 MIN: CPT | Performed by: NURSE PRACTITIONER

## 2020-06-22 RX ORDER — VARENICLINE TARTRATE 1 MG/1
1 TABLET, FILM COATED ORAL 2 TIMES DAILY
Qty: 60 TABLET | Refills: 3 | Status: SHIPPED | OUTPATIENT
Start: 2020-06-22 | End: 2022-01-07 | Stop reason: ALTCHOICE

## 2020-06-22 RX ORDER — VENLAFAXINE HYDROCHLORIDE 75 MG/1
75 CAPSULE, EXTENDED RELEASE ORAL DAILY
Qty: 90 CAPSULE | Refills: 1 | Status: SHIPPED | OUTPATIENT
Start: 2020-06-22 | End: 2021-03-08 | Stop reason: SDUPTHER

## 2020-06-22 RX ORDER — VARENICLINE TARTRATE
KIT
Qty: 1 BOX | Refills: 0 | Status: SHIPPED | OUTPATIENT
Start: 2020-06-22 | End: 2022-01-07 | Stop reason: ALTCHOICE

## 2020-06-22 ASSESSMENT — ENCOUNTER SYMPTOMS: SHORTNESS OF BREATH: 0

## 2020-10-26 ENCOUNTER — VIRTUAL VISIT (OUTPATIENT)
Dept: FAMILY MEDICINE CLINIC | Age: 50
End: 2020-10-26
Payer: COMMERCIAL

## 2020-10-26 PROCEDURE — 99213 OFFICE O/P EST LOW 20 MIN: CPT | Performed by: NURSE PRACTITIONER

## 2020-10-26 RX ORDER — NITROFURANTOIN 25; 75 MG/1; MG/1
100 CAPSULE ORAL 2 TIMES DAILY
Qty: 14 CAPSULE | Refills: 0 | Status: SHIPPED | OUTPATIENT
Start: 2020-10-26 | End: 2020-11-02

## 2020-10-26 RX ORDER — PHENAZOPYRIDINE HYDROCHLORIDE 200 MG/1
200 TABLET, FILM COATED ORAL 3 TIMES DAILY PRN
Qty: 15 TABLET | Refills: 0 | Status: SHIPPED | OUTPATIENT
Start: 2020-10-26 | End: 2020-10-29

## 2020-10-26 ASSESSMENT — ENCOUNTER SYMPTOMS: DIARRHEA: 1

## 2020-10-26 NOTE — PROGRESS NOTES
10/26/2020    TELEHEALTH EVALUATION -- Audio/Visual (During [id number] public health emergency)    Chief Complaint   Patient presents with    Urinary Frequency           Omer Washington (:  1970) has requested an audio/video evaluation for the following concern(s):  HPI:  Pt began having sx urinary pressure and burning 2days ago   Urinary frequency  Reports taking tub baths and having h/o these sx at this time of year. No known fever. Reports some lower abd cramping and diarrhea. Pt usually has constipation and has started a probiotic. Pt has taken home urine test and showing   Leukocytes dark purple  Nitrites negative. Has not visualized blood. Pt has had h/o cystoscopy and has had to take macrodantin after intercourse years/decades. Ago. Review of Systems   Constitutional: Negative for fever. Gastrointestinal: Positive for diarrhea (2-3 in 2days). Genitourinary: Positive for frequency and urgency. Negative for difficulty urinating and hematuria. Prior to Visit Medications    Medication Sig Taking? Authorizing Provider   nitrofurantoin, macrocrystal-monohydrate, (MACROBID) 100 MG capsule Take 1 capsule by mouth 2 times daily for 7 days Yes TINO Peralta   phenazopyridine (PYRIDIUM) 200 MG tablet Take 1 tablet by mouth 3 times daily as needed for Pain Yes TINO Peralta   venlafaxine (EFFEXOR XR) 75 MG extended release capsule Take 1 capsule by mouth daily  TINO Peralta   varenicline (CHANTIX CONTINUING MONTH GARDENIA) 1 MG tablet Take 1 tablet by mouth 2 times daily  TINO Peralta   varenicline (CHANTIX STARTING MONTH GARDENIA) 0.5 MG X 11 & 1 MG X 42 tablet Take by mouth.   TINO Peralta   tobramycin (TOBREX) 0.3 % ophthalmic solution Apply 1 drop to eye every 2 hours  Historical Provider, MD   Potassium 75 MG TABS Take by mouth  Historical Provider, MD   GAVILYTE-G 236 g solution FPD  Historical Provider, MD   Magnesium Citrate 1.745 GM/30ML solution Take by mouth distress      [] Abnormal-   Mental status  [x] Alert and awake  [x] Oriented to person/place/time [x]Able to follow commands      Eyes:  EOM    [x]  Normal  [] Abnormal-  Sclera  [x]  Normal  [] Abnormal -         Discharge [x]  None visible  [] Abnormal -    HENT:   [x] Normocephalic, atraumatic. [] Abnormal   [x] Mouth/Throat: Mucous membranes are moist.     External Ears [x] Normal  [] Abnormal-     Neck: [x] No visualized mass     Pulmonary/Chest: [x] Respiratory effort normal.  [x] No visualized signs of difficulty breathing or respiratory distress        [] Abnormal-      Musculoskeletal:   [x] Normal gait with no signs of ataxia         [x] Normal range of motion of neck        [] Abnormal-       Neurological:        [x] No Facial Asymmetry (Cranial nerve 7 motor function) (limited exam to video visit)          [x] No gaze palsy        [] Abnormal-         Skin:        [x] No significant exanthematous lesions or discoloration noted on facial skin         [] Abnormal-            Psychiatric:       [x] Normal Affect [x] No Hallucinations        [] Abnormal-     Other pertinent observable physical exam findings-     ASSESSMENT/PLAN:  Patient-Reported Vitals 6/22/2020   Patient-Reported Weight 169        1. Cystitis, unspecified without hematuria    - nitrofurantoin, macrocrystal-monohydrate, (MACROBID) 100 MG capsule; Take 1 capsule by mouth 2 times daily for 7 days  Dispense: 14 capsule; Refill: 0  - phenazopyridine (PYRIDIUM) 200 MG tablet; Take 1 tablet by mouth 3 times daily as needed for Pain  Dispense: 15 tablet; Refill: 0    F/u in office if sx persist after 3-4d. No follow-ups on file. Kristen Mckeon is a 44 y.o. female being evaluated by a Virtual Visit (video visit) encounter to address concerns as mentioned above. A caregiver was present when appropriate.  Due to this being a TeleHealth encounter (During [place] [address] public health emergency), evaluation of the following organ systems was limited: Vitals/Constitutional/EENT/Resp/CV/GI//MS/Neuro/Skin/Heme-Lymph-Imm. Pursuant to the emergency declaration under the [address] [place] [address] and the [employer] and [employer] Act, this Virtual Visit was conducted with patient's (and/or legal guardian's) consent, to reduce the patient's risk of exposure to COVID-19 and provide necessary medical care. The patient (and/or legal guardian) has also been advised to contact this office for worsening conditions or problems, and seek emergency medical treatment and/or call 911 if deemed necessary. Services were provided through a video synchronous discussion virtually to substitute for in-person clinic visit. Patient and provider were located at their individual homes. --TINO Shell on 10/26/2020 at 3:48 PM    An electronic signature was used to authenticate this note.

## 2020-11-03 DIAGNOSIS — F32.A ANXIETY AND DEPRESSION: ICD-10-CM

## 2020-11-03 DIAGNOSIS — Z13.220 LIPID SCREENING: ICD-10-CM

## 2020-11-03 DIAGNOSIS — F41.9 ANXIETY AND DEPRESSION: ICD-10-CM

## 2020-11-03 LAB
ALBUMIN SERPL-MCNC: 4.3 G/DL (ref 3.5–5.2)
ALP BLD-CCNC: 42 U/L (ref 35–104)
ALT SERPL-CCNC: 10 U/L (ref 5–33)
ANION GAP SERPL CALCULATED.3IONS-SCNC: 11 MMOL/L (ref 7–19)
AST SERPL-CCNC: 17 U/L (ref 5–32)
BACTERIA: ABNORMAL /HPF
BASOPHILS ABSOLUTE: 0.1 K/UL (ref 0–0.2)
BASOPHILS RELATIVE PERCENT: 0.5 % (ref 0–1)
BILIRUB SERPL-MCNC: <0.2 MG/DL (ref 0.2–1.2)
BILIRUBIN URINE: NEGATIVE
BLOOD, URINE: NEGATIVE
BUN BLDV-MCNC: 16 MG/DL (ref 6–20)
CALCIUM SERPL-MCNC: 9.4 MG/DL (ref 8.6–10)
CHLORIDE BLD-SCNC: 103 MMOL/L (ref 98–111)
CHOLESTEROL, TOTAL: 204 MG/DL (ref 160–199)
CLARITY: CLEAR
CO2: 24 MMOL/L (ref 22–29)
COLOR: YELLOW
CREAT SERPL-MCNC: 0.7 MG/DL (ref 0.5–0.9)
CRYSTALS, UA: ABNORMAL /HPF
EOSINOPHILS ABSOLUTE: 0.2 K/UL (ref 0–0.6)
EOSINOPHILS RELATIVE PERCENT: 2 % (ref 0–5)
EPITHELIAL CELLS, UA: 2 /HPF (ref 0–5)
GFR AFRICAN AMERICAN: >59
GFR NON-AFRICAN AMERICAN: >60
GLUCOSE BLD-MCNC: 95 MG/DL (ref 74–109)
GLUCOSE URINE: NEGATIVE MG/DL
HCT VFR BLD CALC: 43.2 % (ref 37–47)
HDLC SERPL-MCNC: 64 MG/DL (ref 65–121)
HEMOGLOBIN: 14.4 G/DL (ref 12–16)
HYALINE CASTS: 1 /HPF (ref 0–8)
IMMATURE GRANULOCYTES #: 0 K/UL
KETONES, URINE: NEGATIVE MG/DL
LDL CHOLESTEROL CALCULATED: 122 MG/DL
LEUKOCYTE ESTERASE, URINE: ABNORMAL
LYMPHOCYTES ABSOLUTE: 2.8 K/UL (ref 1.1–4.5)
LYMPHOCYTES RELATIVE PERCENT: 27 % (ref 20–40)
MCH RBC QN AUTO: 31.8 PG (ref 27–31)
MCHC RBC AUTO-ENTMCNC: 33.3 G/DL (ref 33–37)
MCV RBC AUTO: 95.4 FL (ref 81–99)
MONOCYTES ABSOLUTE: 0.5 K/UL (ref 0–0.9)
MONOCYTES RELATIVE PERCENT: 4.9 % (ref 0–10)
NEUTROPHILS ABSOLUTE: 6.8 K/UL (ref 1.5–7.5)
NEUTROPHILS RELATIVE PERCENT: 65.4 % (ref 50–65)
NITRITE, URINE: NEGATIVE
PDW BLD-RTO: 12.6 % (ref 11.5–14.5)
PH UA: 7 (ref 5–8)
PLATELET # BLD: 220 K/UL (ref 130–400)
PMV BLD AUTO: 11.9 FL (ref 9.4–12.3)
POTASSIUM REFLEX MAGNESIUM: 4.5 MMOL/L (ref 3.5–5)
PROTEIN UA: NEGATIVE MG/DL
RBC # BLD: 4.53 M/UL (ref 4.2–5.4)
RBC UA: 1 /HPF (ref 0–4)
SODIUM BLD-SCNC: 138 MMOL/L (ref 136–145)
SPECIFIC GRAVITY UA: 1 (ref 1–1.03)
T4 FREE: 0.99 NG/DL (ref 0.93–1.7)
TOTAL PROTEIN: 7 G/DL (ref 6.6–8.7)
TRIGL SERPL-MCNC: 88 MG/DL (ref 0–149)
TSH SERPL DL<=0.05 MIU/L-ACNC: 1.52 UIU/ML (ref 0.27–4.2)
UROBILINOGEN, URINE: 0.2 E.U./DL
VITAMIN D 25-HYDROXY: 43.9 NG/ML
WBC # BLD: 10.4 K/UL (ref 4.8–10.8)
WBC UA: 4 /HPF (ref 0–5)

## 2020-12-21 ENCOUNTER — HOSPITAL ENCOUNTER (OUTPATIENT)
Dept: WOMENS IMAGING | Age: 50
Discharge: HOME | End: 2020-12-21
Payer: COMMERCIAL

## 2020-12-21 PROCEDURE — 77067 SCR MAMMO BI INCL CAD: CPT

## 2021-03-08 DIAGNOSIS — F41.9 ANXIETY AND DEPRESSION: ICD-10-CM

## 2021-03-08 DIAGNOSIS — F32.A ANXIETY AND DEPRESSION: ICD-10-CM

## 2021-03-08 RX ORDER — VENLAFAXINE HYDROCHLORIDE 75 MG/1
75 CAPSULE, EXTENDED RELEASE ORAL DAILY
Qty: 90 CAPSULE | Refills: 1 | Status: SHIPPED | OUTPATIENT
Start: 2021-03-08 | End: 2021-12-09 | Stop reason: SDUPTHER

## 2021-05-17 ENCOUNTER — APPOINTMENT (OUTPATIENT)
Dept: VACCINE CLINIC | Facility: HOSPITAL | Age: 51
End: 2021-05-17

## 2021-12-09 DIAGNOSIS — F41.9 ANXIETY AND DEPRESSION: ICD-10-CM

## 2021-12-09 DIAGNOSIS — F32.A ANXIETY AND DEPRESSION: ICD-10-CM

## 2021-12-09 RX ORDER — VENLAFAXINE HYDROCHLORIDE 75 MG/1
75 CAPSULE, EXTENDED RELEASE ORAL DAILY
Qty: 30 CAPSULE | Refills: 0 | Status: SHIPPED | OUTPATIENT
Start: 2021-12-09 | End: 2022-01-07 | Stop reason: SDUPTHER

## 2022-01-07 ENCOUNTER — VIRTUAL VISIT (OUTPATIENT)
Dept: FAMILY MEDICINE CLINIC | Age: 52
End: 2022-01-07
Payer: COMMERCIAL

## 2022-01-07 DIAGNOSIS — Z12.11 SCREEN FOR COLON CANCER: ICD-10-CM

## 2022-01-07 DIAGNOSIS — Z72.0 TOBACCO ABUSE: ICD-10-CM

## 2022-01-07 DIAGNOSIS — F41.9 ANXIETY AND DEPRESSION: ICD-10-CM

## 2022-01-07 DIAGNOSIS — F32.A ANXIETY AND DEPRESSION: ICD-10-CM

## 2022-01-07 DIAGNOSIS — Z11.4 ENCOUNTER FOR SCREENING FOR HIV: ICD-10-CM

## 2022-01-07 DIAGNOSIS — Z13.220 SCREENING, LIPID: Primary | ICD-10-CM

## 2022-01-07 DIAGNOSIS — N92.6 MENSTRUAL ABNORMALITY: ICD-10-CM

## 2022-01-07 DIAGNOSIS — Z11.59 NEED FOR HEPATITIS C SCREENING TEST: ICD-10-CM

## 2022-01-07 PROCEDURE — 99214 OFFICE O/P EST MOD 30 MIN: CPT | Performed by: NURSE PRACTITIONER

## 2022-01-07 RX ORDER — BUPROPION HYDROCHLORIDE 75 MG/1
TABLET ORAL
Qty: 60 TABLET | Refills: 2 | Status: SHIPPED | OUTPATIENT
Start: 2022-01-07 | End: 2022-01-19 | Stop reason: ALTCHOICE

## 2022-01-07 RX ORDER — VENLAFAXINE HYDROCHLORIDE 75 MG/1
75 CAPSULE, EXTENDED RELEASE ORAL DAILY
Qty: 30 CAPSULE | Refills: 5 | Status: SHIPPED | OUTPATIENT
Start: 2022-01-07 | End: 2022-08-31 | Stop reason: SDUPTHER

## 2022-01-07 SDOH — ECONOMIC STABILITY: FOOD INSECURITY: WITHIN THE PAST 12 MONTHS, THE FOOD YOU BOUGHT JUST DIDN'T LAST AND YOU DIDN'T HAVE MONEY TO GET MORE.: NEVER TRUE

## 2022-01-07 SDOH — ECONOMIC STABILITY: FOOD INSECURITY: WITHIN THE PAST 12 MONTHS, YOU WORRIED THAT YOUR FOOD WOULD RUN OUT BEFORE YOU GOT MONEY TO BUY MORE.: NEVER TRUE

## 2022-01-07 ASSESSMENT — PATIENT HEALTH QUESTIONNAIRE - PHQ9
2. FEELING DOWN, DEPRESSED OR HOPELESS: 0
SUM OF ALL RESPONSES TO PHQ9 QUESTIONS 1 & 2: 0
SUM OF ALL RESPONSES TO PHQ QUESTIONS 1-9: 0
1. LITTLE INTEREST OR PLEASURE IN DOING THINGS: 0

## 2022-01-07 ASSESSMENT — SOCIAL DETERMINANTS OF HEALTH (SDOH): HOW HARD IS IT FOR YOU TO PAY FOR THE VERY BASICS LIKE FOOD, HOUSING, MEDICAL CARE, AND HEATING?: NOT HARD AT ALL

## 2022-01-07 ASSESSMENT — ENCOUNTER SYMPTOMS: RESPIRATORY NEGATIVE: 1

## 2022-01-07 NOTE — PROGRESS NOTES
2022    TELEHEALTH EVALUATION -- Audio/Visual (During DNQMA-87 public health emergency)    Chief Complaint   Patient presents with    Medication Refill    Discuss Medications    Other     wants to discuss covid vaccine and also has questions about menopause           Vahid Child (:  1970) has requested an audio/video evaluation for the following concern(s):  HPI:      Pt did obtain 1 covid vaccine. She was exposed to covid at the time that the second shot was due and she did not go. Pt has significant concerns of clotting issues that has occurred with people she knows. Patient also has concern that she would like to stop smoking and is interested in starting Wellbutrin. She is currently taking Effexor and would like to continue that medication as well. She and I have spoken of adding low-dose bupropion and she is wanting to move forward with this. Patient also has family history of colon cancer. She has had a referral for colonoscopy in the past but related to Covid that was delayed. She has agreed for further referral    She is due for annual lab. She will return to our lab fasting for this. 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 She would like to move forward with hep C and HIV screening. Does state that her last menstrual cycle was nearing 1 year. With this blood work we will also do an [facility] and patient will return for Pap in approximately March. Review of Systems   Constitutional: Negative. Respiratory: Negative. Cardiovascular: Negative. Genitourinary: Positive for menstrual problem. Psychiatric/Behavioral: The patient is nervous/anxious (controlled with effexor). Prior to Visit Medications    Medication Sig Taking?  Authorizing Provider   venlafaxine (EFFEXOR XR) 75 MG extended release capsule Take 1 capsule by mouth daily Yes TINO Peralta   buPROPion (WELLBUTRIN) 75 MG tablet 1 po every am and after 2wks may increase to twice daily Yes TINO Peralta   Potassium 75 MG TABS Take by mouth Yes Historical Provider, MD   Magnesium Citrate 1.745 GM/30ML solution Take by mouth Yes Historical Provider, MD   Cholecalciferol (VITAMIN D3) 5000 units CAPS Take 5,000 Units by mouth Yes Historical Provider, MD   MULTIPLE VITAMIN PO Take by mouth Yes Historical Provider, MD   NASONEX 50 MCG/ACT nasal spray INSTILL 2 SPRAYS IN EACH NOSTIL DAILY Yes TINO Silvestre   Probiotic Product (PROBIOTIC DAILY PO) Take  by mouth.  Yes Historical Provider, MD   tobramycin (TOBREX) 0.3 % ophthalmic solution Apply 1 drop to eye every 2 hours  Historical Provider, MD       Social History     Tobacco Use    Smoking status: Current Every Day Smoker     Packs/day: 0.50     Years: 20.00     Pack years: 10.00     Types: Cigarettes     Start date: 1989    Smokeless tobacco: Never Used   Substance Use Topics    Alcohol use: Yes     Comment: rare    Drug use: No        Allergies   Allergen Reactions    Codeine     Sulfa Antibiotics    ,   Past Medical History:   Diagnosis Date    Allergic rhinitis     Anxiety     Depression     Mitral valve prolapse    ,   Past Surgical History:   Procedure Laterality Date    CARPAL TUNNEL RELEASE Right      SECTION      NASAL FRACTURE SURGERY      URETHRA SURGERY     ,   Social History     Tobacco Use    Smoking status: Current Every Day Smoker     Packs/day: 0.50     Years: 20.00     Pack years: 10.00     Types: Cigarettes     Start date: 1989    Smokeless tobacco: Never Used   Substance Use Topics    Alcohol use: Yes     Comment: rare    Drug use: No   ,   Family History   Problem Relation Age of Onset    Asthma Mother     Asthma Brother     Heart Disease Paternal Aunt     Asthma Child        PHYSICAL EXAMINATION:  [ INSTRUCTIONS:  \"[x]\" Indicates a positive item  \"[]\" Indicates a negative item  -- DELETE ALL ITEMS NOT EXAMINED]  Vital Signs: There were no vitals taken for this visit. Patient-Reported Vitals 1/7/2022   Patient-Reported Weight 176 lbs   Patient-Reported Height 5'7\"   Patient-Reported Systolic (No Data)   Patient-Reported Diastolic (No Data)   Patient-Reported Pulse 90   Patient-Reported Temperature 98.6     Constitutional: [x] Appears well-developed and well-nourished [x] No apparent distress      [] Abnormal-   Mental status  [x] Alert and awake  [x] Oriented to person/place/time [x]Able to follow commands      Eyes:  EOM    [x]  Normal  [] Abnormal-  Sclera  [x]  Normal  [] Abnormal -         Discharge [x]  None visible  [] Abnormal -    HENT:   [x] Normocephalic, atraumatic. [] Abnormal   [x] Mouth/Throat: Mucous membranes are moist.     External Ears [x] Normal  [] Abnormal-     Neck: [x] No visualized mass     Pulmonary/Chest: [x] Respiratory effort normal.  [x] No visualized signs of difficulty breathing or respiratory distress        [] Abnormal-      Musculoskeletal:   [x] Normal gait with no signs of ataxia         [x] Normal range of motion of neck        [] Abnormal-       Neurological:        [x] No Facial Asymmetry (Cranial nerve 7 motor function) (limited exam to video visit)          [x] No gaze palsy        [] Abnormal-         Skin:        [x] No significant exanthematous lesions or discoloration noted on facial skin         [] Abnormal-            Psychiatric:       [x] Normal Affect [x] No Hallucinations        [] Abnormal-     Other pertinent observable physical exam findings-     ASSESSMENT/PLAN:  Patient-Reported Vitals 1/7/2022   Patient-Reported Weight 176 lbs   Patient-Reported Height 5'7\"   Patient-Reported Systolic (No Data)   Patient-Reported Diastolic (No Data)   Patient-Reported Pulse 90   Patient-Reported Temperature 98.6        1. Anxiety and depression    - venlafaxine (EFFEXOR XR) 75 MG extended release capsule;  Take 1 capsule by mouth daily  Dispense: 30 capsule; Refill: 5  - Urinalysis Reflex to Culture; Future  - CBC Auto Differential; Future  - Comprehensive Metabolic Panel w/ Reflex to MG; Future  - TSH WITH REFLEX TO FT4; Future  - Vitamin D 25 Hydroxy; Future    2. Screening, lipid    - Lipid Panel; Future    3. Tobacco abuse    - buPROPion (WELLBUTRIN) 75 MG tablet; 1 po every am and after 2wks may increase to twice daily  Dispense: 60 tablet; Refill: 2    4. Menstrual abnormality    - Follicle Stimulating Hormone; Future    5. Encounter for screening for HIV    - HIV Rapid 1&2; Future    6. Need for hepatitis C screening test    - Hepatitis C Antibody; Future    I have recommended pt consider the 2nd covid vaccine. Low dose addition of wellbutrin   Schedule pap  Referral.  Side effects of new treatment discussed. Pt will f/u asap if any increase in negativity. Never stop medication without consulting healthcare provider. No follow-ups on file. Gerson Barnes is a 45 y.o. female being evaluated by a Virtual Visit (video visit) encounter to address concerns as mentioned above. A caregiver was present when appropriate. Due to this being a TeleHealth encounter (During TKZXO-51 public health emergency), evaluation of the following organ systems was limited: Vitals/Constitutional/EENT/Resp/CV/GI//MS/Neuro/Skin/Heme-Lymph-Imm. Pursuant to the emergency declaration under the [facility], [address] [place] [address] authority and the [employer] and [employer] Act, this Virtual Visit was conducted with patient's (and/or legal guardian's) consent, to reduce the patient's risk of exposure to COVID-19 and provide necessary medical care. The patient (and/or legal guardian) has also been advised to contact this office for worsening conditions or problems, and seek emergency medical treatment and/or call 911 if deemed necessary.      Services were provided through a video synchronous discussion virtually to substitute for in-person clinic visit. Patient and provider were located at their individual homes. --TINO Obando on 1/7/2022 at 3:05 PM    An electronic signature was used to authenticate this note.

## 2022-01-19 ENCOUNTER — OFFICE VISIT (OUTPATIENT)
Dept: FAMILY MEDICINE CLINIC | Age: 52
End: 2022-01-19
Payer: COMMERCIAL

## 2022-01-19 VITALS
TEMPERATURE: 97.8 F | DIASTOLIC BLOOD PRESSURE: 80 MMHG | OXYGEN SATURATION: 99 % | HEIGHT: 67 IN | SYSTOLIC BLOOD PRESSURE: 134 MMHG | HEART RATE: 77 BPM | WEIGHT: 180 LBS | BODY MASS INDEX: 28.25 KG/M2

## 2022-01-19 DIAGNOSIS — J06.9 ACUTE URI: Primary | ICD-10-CM

## 2022-01-19 DIAGNOSIS — N92.6 MENSTRUAL ABNORMALITY: ICD-10-CM

## 2022-01-19 DIAGNOSIS — R05.9 COUGH: ICD-10-CM

## 2022-01-19 DIAGNOSIS — Z11.4 ENCOUNTER FOR SCREENING FOR HIV: ICD-10-CM

## 2022-01-19 DIAGNOSIS — Z72.0 TOBACCO ABUSE: ICD-10-CM

## 2022-01-19 DIAGNOSIS — F32.A ANXIETY AND DEPRESSION: ICD-10-CM

## 2022-01-19 DIAGNOSIS — J02.9 SORE THROAT: ICD-10-CM

## 2022-01-19 DIAGNOSIS — Z11.59 NEED FOR HEPATITIS C SCREENING TEST: ICD-10-CM

## 2022-01-19 DIAGNOSIS — F41.9 ANXIETY AND DEPRESSION: ICD-10-CM

## 2022-01-19 DIAGNOSIS — Z13.220 SCREENING, LIPID: ICD-10-CM

## 2022-01-19 LAB
ALBUMIN SERPL-MCNC: 4.3 G/DL (ref 3.5–5.2)
ALP BLD-CCNC: 49 U/L (ref 35–104)
ALT SERPL-CCNC: 11 U/L (ref 5–33)
ANION GAP SERPL CALCULATED.3IONS-SCNC: 10 MMOL/L (ref 7–19)
AST SERPL-CCNC: 21 U/L (ref 5–32)
BACTERIA: ABNORMAL /HPF
BASOPHILS ABSOLUTE: 0 K/UL (ref 0–0.2)
BASOPHILS RELATIVE PERCENT: 0.7 % (ref 0–1)
BILIRUB SERPL-MCNC: <0.2 MG/DL (ref 0.2–1.2)
BILIRUBIN URINE: NEGATIVE
BLOOD, URINE: ABNORMAL
BUN BLDV-MCNC: 9 MG/DL (ref 6–20)
CALCIUM SERPL-MCNC: 9.3 MG/DL (ref 8.6–10)
CHLORIDE BLD-SCNC: 108 MMOL/L (ref 98–111)
CHOLESTEROL, TOTAL: 197 MG/DL (ref 160–199)
CLARITY: ABNORMAL
CO2: 26 MMOL/L (ref 22–29)
COLOR: YELLOW
CREAT SERPL-MCNC: 0.7 MG/DL (ref 0.5–0.9)
CRYSTALS, UA: ABNORMAL /HPF
EOSINOPHILS ABSOLUTE: 0.2 K/UL (ref 0–0.6)
EOSINOPHILS RELATIVE PERCENT: 4.2 % (ref 0–5)
EPITHELIAL CELLS, UA: 6 /HPF (ref 0–5)
FOLLICLE STIMULATING HORMONE: 57.4 MIU/ML
GFR AFRICAN AMERICAN: >59
GFR NON-AFRICAN AMERICAN: >60
GLUCOSE BLD-MCNC: 87 MG/DL (ref 74–109)
GLUCOSE URINE: NEGATIVE MG/DL
HCT VFR BLD CALC: 44.3 % (ref 37–47)
HDLC SERPL-MCNC: 58 MG/DL (ref 65–121)
HEMOGLOBIN: 14.4 G/DL (ref 12–16)
HEPATITIS C ANTIBODY INTERPRETATION: NORMAL
HIV-1 P24 AG: NORMAL
HYALINE CASTS: 5 /HPF (ref 0–8)
IMMATURE GRANULOCYTES #: 0 K/UL
INFLUENZA A ANTIBODY: NORMAL
INFLUENZA B ANTIBODY: NORMAL
KETONES, URINE: NEGATIVE MG/DL
LDL CHOLESTEROL CALCULATED: 122 MG/DL
LEUKOCYTE ESTERASE, URINE: ABNORMAL
LYMPHOCYTES ABSOLUTE: 1.7 K/UL (ref 1.1–4.5)
LYMPHOCYTES RELATIVE PERCENT: 39.7 % (ref 20–40)
MCH RBC QN AUTO: 31.4 PG (ref 27–31)
MCHC RBC AUTO-ENTMCNC: 32.5 G/DL (ref 33–37)
MCV RBC AUTO: 96.7 FL (ref 81–99)
MONOCYTES ABSOLUTE: 0.3 K/UL (ref 0–0.9)
MONOCYTES RELATIVE PERCENT: 7 % (ref 0–10)
NEUTROPHILS ABSOLUTE: 2.1 K/UL (ref 1.5–7.5)
NEUTROPHILS RELATIVE PERCENT: 48.2 % (ref 50–65)
NITRITE, URINE: NEGATIVE
PDW BLD-RTO: 12.7 % (ref 11.5–14.5)
PH UA: 6.5 (ref 5–8)
PLATELET # BLD: 176 K/UL (ref 130–400)
PMV BLD AUTO: 12.1 FL (ref 9.4–12.3)
POTASSIUM REFLEX MAGNESIUM: 4.4 MMOL/L (ref 3.5–5)
PROTEIN UA: NEGATIVE MG/DL
RAPID HIV 1&2: NORMAL
RBC # BLD: 4.58 M/UL (ref 4.2–5.4)
RBC UA: 3 /HPF (ref 0–4)
S PYO AG THROAT QL: NORMAL
SARS-COV-2, PCR: DETECTED
SODIUM BLD-SCNC: 144 MMOL/L (ref 136–145)
SPECIFIC GRAVITY UA: 1 (ref 1–1.03)
TOTAL PROTEIN: 7.1 G/DL (ref 6.6–8.7)
TRIGL SERPL-MCNC: 85 MG/DL (ref 0–149)
TSH REFLEX FT4: 1.34 UIU/ML (ref 0.35–5.5)
UROBILINOGEN, URINE: 0.2 E.U./DL
VITAMIN D 25-HYDROXY: 39 NG/ML
WBC # BLD: 4.3 K/UL (ref 4.8–10.8)
WBC UA: 21 /HPF (ref 0–5)

## 2022-01-19 PROCEDURE — 99214 OFFICE O/P EST MOD 30 MIN: CPT | Performed by: NURSE PRACTITIONER

## 2022-01-19 PROCEDURE — 87880 STREP A ASSAY W/OPTIC: CPT | Performed by: NURSE PRACTITIONER

## 2022-01-19 PROCEDURE — 87804 INFLUENZA ASSAY W/OPTIC: CPT | Performed by: NURSE PRACTITIONER

## 2022-01-19 RX ORDER — NICOTINE 21 MG/24HR
1 PATCH, TRANSDERMAL 24 HOURS TRANSDERMAL DAILY
Qty: 30 PATCH | Refills: 1 | Status: SHIPPED | OUTPATIENT
Start: 2022-01-19 | End: 2022-04-15 | Stop reason: ALTCHOICE

## 2022-01-19 RX ORDER — NICOTINE 21 MG/24HR
1 PATCH, TRANSDERMAL 24 HOURS TRANSDERMAL DAILY
Qty: 30 PATCH | Refills: 1 | Status: SHIPPED | OUTPATIENT
Start: 2022-01-19 | End: 2022-02-18

## 2022-01-19 RX ORDER — AZITHROMYCIN 250 MG/1
250 TABLET, FILM COATED ORAL SEE ADMIN INSTRUCTIONS
Qty: 6 TABLET | Refills: 0 | Status: SHIPPED | OUTPATIENT
Start: 2022-01-19 | End: 2022-01-24

## 2022-01-19 RX ORDER — DEXTROMETHORPHAN HYDROBROMIDE AND PROMETHAZINE HYDROCHLORIDE 15; 6.25 MG/5ML; MG/5ML
5 SYRUP ORAL 4 TIMES DAILY PRN
Qty: 120 ML | Refills: 0 | Status: SHIPPED | OUTPATIENT
Start: 2022-01-19 | End: 2022-01-26

## 2022-01-19 RX ORDER — ASCORBIC ACID 500 MG
500 TABLET ORAL DAILY
COMMUNITY
End: 2022-04-15 | Stop reason: ALTCHOICE

## 2022-01-19 RX ORDER — PREDNISONE 20 MG/1
20 TABLET ORAL DAILY
Qty: 7 TABLET | Refills: 0 | Status: SHIPPED | OUTPATIENT
Start: 2022-01-19 | End: 2022-01-26

## 2022-01-19 ASSESSMENT — ENCOUNTER SYMPTOMS
DIARRHEA: 1
SORE THROAT: 1
COUGH: 1

## 2022-01-19 NOTE — PROGRESS NOTES
SUBJECTIVE:    Patient ID: Gini Madrigal is a48 y.o. female. Gini Madrigal is here today for Congestion (nasal congestion, cough), Diarrhea, and Pharyngitis  . HPI:   HPI       No known fever but \"may\" have had 5nights ago  Congestion  Sore throat  Diarrhea off and on  tx-mucinex    Started wellbutrin \"love the energy\"  Has had hyperfocus issues since starting it. Has been 25yrs since anxiety/hyperfocus. Pt has not taken this today. Wants to stop smoking and is interested in nicotine patches. Office Visit on 01/19/2022   Component Date Value Ref Range Status    Influenza A Ab 01/19/2022 neg   Final    Influenza B Ab 01/19/2022 neg   Final    Strep A Ag 01/19/2022 None Detected  None Detected Final         Past Medical History:   Diagnosis Date    Allergic rhinitis     Anxiety     Depression     Mitral valve prolapse      Prior to Visit Medications    Medication Sig Taking?  Authorizing Provider   ELDERBERRY PO Take by mouth Yes Historical Provider, MD   vitamin C (ASCORBIC ACID) 500 MG tablet Take 500 mg by mouth daily Yes Historical Provider, MD   nicotine (NICODERM CQ) 21 MG/24HR Place 1 patch onto the skin daily Yes TINO Peralta   nicotine (NICODERM CQ) 14 MG/24HR Place 1 patch onto the skin daily Yes TINO Peralta   nicotine (NICODERM CQ) 7 MG/24HR Place 1 patch onto the skin daily Yes TINO Peralta   predniSONE (DELTASONE) 20 MG tablet Take 1 tablet by mouth daily for 7 days Yes TINO Peralta   azithromycin (ZITHROMAX) 250 MG tablet Take 1 tablet by mouth See Admin Instructions for 5 days 500mg on day 1 followed by 250mg on days 2 - 5 Yes TINO Peralta   venlafaxine (EFFEXOR XR) 75 MG extended release capsule Take 1 capsule by mouth daily Yes TINO Peralta   Potassium 75 MG TABS Take by mouth Yes Historical Provider, MD   Magnesium Citrate 1.745 GM/30ML solution Take by mouth Yes Historical Provider, MD   Cholecalciferol (VITAMIN D3) 5000 units CAPS Take 5,000 Units by mouth Yes Historical Provider, MD   NASONEX 50 MCG/ACT nasal spray INSTILL 2 SPRAYS IN EACH NOSTIL DAILY Yes TINO Arechiga   Probiotic Product (PROBIOTIC DAILY PO) Take  by mouth. Yes Historical Provider, MD     Allergies   Allergen Reactions    Codeine     Sulfa Antibiotics      Past Surgical History:   Procedure Laterality Date    CARPAL TUNNEL RELEASE Right      SECTION      NASAL FRACTURE SURGERY      URETHRA SURGERY       Family History   Problem Relation Age of Onset    Asthma Mother     Asthma Brother     Heart Disease Paternal Aunt     Asthma Child      Social History     Socioeconomic History    Marital status:      Spouse name: Not on file    Number of children: Not on file    Years of education: Not on file    Highest education level: Not on file   Occupational History    Not on file   Tobacco Use    Smoking status: Current Every Day Smoker     Packs/day: 0.50     Years: 20.00     Pack years: 10.00     Types: Cigarettes     Start date: 1989    Smokeless tobacco: Never Used   Substance and Sexual Activity    Alcohol use: Yes     Comment: rare    Drug use: No    Sexual activity: Not on file   Other Topics Concern    Not on file   Social History Narrative    Not on file     Social Determinants of Health     Financial Resource Strain: Low Risk     Difficulty of Paying Living Expenses: Not hard at all   Food Insecurity: No Food Insecurity    Worried About Running Out of Food in the Last Year: Never true    Abby of Food in the Last Year: Never true   Transportation Needs:     Lack of Transportation (Medical): Not on file    Lack of Transportation (Non-Medical):  Not on file   Physical Activity:     Days of Exercise per Week: Not on file    Minutes of Exercise per Session: Not on file   Stress:     Feeling of Stress : Not on file   Social Connections:     Frequency of Communication with Friends and Family: Not on file    Frequency of Social Gatherings with Friends and Family: Not on file    Attends [employer] Services: Not on file    Active Member of Clubs or Organizations: Not on file    Attends Club or Organization Meetings: Not on file    Marital Status: Not on file   Intimate Partner Violence:     Fear of Current or Ex-Partner: Not on file    Emotionally Abused: Not on file    Physically Abused: Not on file    Sexually Abused: Not on file   Housing Stability:     Unable to Pay for Housing in the Last Year: Not on file    Number of [place] in the Last Year: Not on file    Unstable Housing in the Last Year: Not on file       Review of Systems   Constitutional: Positive for fever (day 1). HENT: Positive for congestion and sore throat. Respiratory: Positive for cough. Gastrointestinal: Positive for diarrhea. Psychiatric/Behavioral: The patient is nervous/anxious (with addition of wellbutrin). OBJECTIVE:    Physical Exam  Vitals and nursing note reviewed. Constitutional:       General: She is not in acute distress. Appearance: Normal appearance. She is well-developed. She is not ill-appearing, toxic-appearing or diaphoretic. HENT:      Head: Normocephalic and atraumatic. Right Ear: Tympanic membrane, ear canal and external ear normal.      Left Ear: Tympanic membrane, ear canal and external ear normal.      Nose: Nose normal.      Right Sinus: No maxillary sinus tenderness or frontal sinus tenderness. Left Sinus: No maxillary sinus tenderness or frontal sinus tenderness. Mouth/Throat:      Mouth: Mucous membranes are moist.      Pharynx: Oropharynx is clear. Uvula midline. No oropharyngeal exudate or posterior oropharyngeal erythema. Eyes:      Extraocular Movements: Extraocular movements intact. Conjunctiva/sclera: Conjunctivae normal.      Pupils: Pupils are equal, round, and reactive to light. Neck:      Trachea: No tracheal deviation.    Cardiovascular:      Rate and Rhythm: Normal rate and regular rhythm. Pulses: Normal pulses. Heart sounds: Normal heart sounds. Pulmonary:      Effort: Pulmonary effort is normal. No respiratory distress. Breath sounds: Normal breath sounds. Abdominal:      Palpations: Abdomen is soft. Tenderness: There is no abdominal tenderness. Musculoskeletal:      Cervical back: Normal range of motion and neck supple. No rigidity. Lymphadenopathy:      Cervical: No cervical adenopathy. Skin:     General: Skin is warm and dry. Capillary Refill: Capillary refill takes less than 2 seconds. Neurological:      General: No focal deficit present. Mental Status: She is alert and oriented to person, place, and time. Psychiatric:         Mood and Affect: Mood normal.         Behavior: Behavior normal.         Thought Content: Thought content normal.         Judgment: Judgment normal.         /80 (Site: Left Upper Arm, Position: Sitting, Cuff Size: Medium Adult)   Pulse 77   Temp 97.8 °F (36.6 °C) (Temporal)   Ht 5' 7\" (1.702 m)   Wt 180 lb (81.6 kg)   SpO2 99%   BMI 28.19 kg/m²      ASSESSMENT:      ICD-10-CM    1. Acute URI  J06.9 predniSONE (DELTASONE) 20 MG tablet     azithromycin (ZITHROMAX) 250 MG tablet   2. Cough  R05.9 POCT Influenza A/B     POCT rapid strep A     COVID-19     COVID-19   3. Sore throat  J02.9 POCT Influenza A/B     POCT rapid strep A     COVID-19     COVID-19   4. Tobacco abuse  Z72.0 nicotine (NICODERM CQ) 21 MG/24HR     nicotine (NICODERM CQ) 14 MG/24HR     nicotine (NICODERM CQ) 7 MG/24HR       PLAN:    Vidya : Congestion (nasal congestion, cough), Diarrhea, and Pharyngitis  Stop Wellbutrin  Continue effexor  Stat strep, flu, covid swabs  Start zpak and prednisone today  Continue Mucinex 1200mg twice daily  Cough med also sent to pharmacy for bedtime use if needed  [address] [place] [address] testing therefore quarantine until negative test called or called with further info.    ER if any sudden worsening. Diagnosis and orders for this visit are above. Please note that this chart was generated using dragon dictationsoftware. Although every effort was made to ensure the accuracy of this automated transcription, some errors in transcription may have occurred.

## 2022-01-19 NOTE — PATIENT INSTRUCTIONS
Start zpak and prednisone today  Continue Mucinex 1200mg twice daily  Cough med also sent to pharmacy for bedtime use if needed

## 2022-01-21 DIAGNOSIS — N39.0 URINARY TRACT INFECTION WITHOUT HEMATURIA, SITE UNSPECIFIED: Primary | ICD-10-CM

## 2022-01-21 LAB
ORGANISM: ABNORMAL
URINE CULTURE, ROUTINE: ABNORMAL
URINE CULTURE, ROUTINE: ABNORMAL

## 2022-01-21 RX ORDER — NITROFURANTOIN 25; 75 MG/1; MG/1
100 CAPSULE ORAL 2 TIMES DAILY
Qty: 14 CAPSULE | Refills: 0 | Status: SHIPPED | OUTPATIENT
Start: 2022-01-21 | End: 2022-01-28

## 2022-01-28 DIAGNOSIS — N39.0 URINARY TRACT INFECTION WITHOUT HEMATURIA, SITE UNSPECIFIED: ICD-10-CM

## 2022-01-28 DIAGNOSIS — N39.0 URINARY TRACT INFECTION WITHOUT HEMATURIA, SITE UNSPECIFIED: Primary | ICD-10-CM

## 2022-01-28 LAB
BILIRUBIN URINE: NEGATIVE
BLOOD, URINE: NEGATIVE
CLARITY: CLEAR
COLOR: YELLOW
GLUCOSE URINE: NEGATIVE MG/DL
KETONES, URINE: NEGATIVE MG/DL
LEUKOCYTE ESTERASE, URINE: NEGATIVE
NITRITE, URINE: NEGATIVE
PH UA: 7 (ref 5–8)
PROTEIN UA: NEGATIVE MG/DL
SPECIFIC GRAVITY UA: 1.01 (ref 1–1.03)
UROBILINOGEN, URINE: 0.2 E.U./DL

## 2022-02-04 ENCOUNTER — TELEMEDICINE (OUTPATIENT)
Dept: GASTROENTEROLOGY | Age: 52
End: 2022-02-04
Payer: COMMERCIAL

## 2022-02-04 DIAGNOSIS — K58.1 IRRITABLE BOWEL SYNDROME WITH CONSTIPATION: Primary | ICD-10-CM

## 2022-02-04 DIAGNOSIS — Z80.0 FAMILY HX OF COLON CANCER: ICD-10-CM

## 2022-02-04 PROCEDURE — 99204 OFFICE O/P NEW MOD 45 MIN: CPT | Performed by: NURSE PRACTITIONER

## 2022-02-04 ASSESSMENT — ENCOUNTER SYMPTOMS
BLOOD IN STOOL: 0
DIARRHEA: 0
ABDOMINAL DISTENTION: 1
CHOKING: 0
CONSTIPATION: 1
ABDOMINAL PAIN: 1
SHORTNESS OF BREATH: 0
RECTAL PAIN: 0
VOMITING: 0
ANAL BLEEDING: 0
TROUBLE SWALLOWING: 0
NAUSEA: 0
COUGH: 0

## 2022-02-04 NOTE — PROGRESS NOTES
2022    TELEHEALTH EVALUATION -- Audio/Visual (During RHNRF-35 public health emergency)    HPI:    Vahid Child (:  1970) has requested an audio/video evaluation for the following concern(s):    Pt was referred for colonoscopy. When screening for open access eligibility, pt had c/o changes in bowel habits for the last month. She reports chronic constipation. She had recent bout of COVID and reports diarrhea with this. Since then, she states she will have 2 BM a week. Previously would go every 2 weeks. This is associated with abdominal pain and bloating. She denies any blood in stool. She has tried and failed Miralax and Metamucil. She has never had a colonoscopy   Family hx colon cancer - Father    Review of Systems   Constitutional: Negative for activity change, appetite change, fatigue, fever and unexpected weight change. HENT: Negative for trouble swallowing. Respiratory: Negative for cough, choking and shortness of breath. Cardiovascular: Negative for chest pain. Gastrointestinal: Positive for abdominal distention (bloating), abdominal pain and constipation. Negative for anal bleeding, blood in stool, diarrhea, nausea, rectal pain and vomiting. Allergic/Immunologic: Negative for food allergies. All other systems reviewed and are negative. Prior to Visit Medications    Medication Sig Taking?  Authorizing Provider   linaclotide (LINZESS) 145 MCG capsule Take 1 capsule by mouth every morning (before breakfast) Yes TINO Saul NP   CRANBERRY PO Take by mouth daily  Historical Provider, MD   APPLE CIDER VINEGAR PO Take by mouth daily  Historical Provider, MD   KRILL OIL PO Take by mouth as needed  Historical Provider, MD   ZINC PO Take by mouth as needed  Historical Provider, MD Margarita Cardoso Tea, Crista sinensis, (GREEN TEA PO) Take by mouth as needed  Historical Provider, MD   vitamin C (ASCORBIC ACID) 500 MG tablet Take 500 mg by mouth daily  Historical Cancer Father     Esophageal Cancer Neg Hx     Liver Cancer Neg Hx     Rectal Cancer Neg Hx     Stomach Cancer Neg Hx        PHYSICAL EXAMINATION:  [ INSTRUCTIONS:  \"[x]\" Indicates a positive item  \"[]\" Indicates a negative item  -- DELETE ALL ITEMS NOT EXAMINED]  Vital Signs: (As obtained by patient/caregiver or practitioner observation)    Blood pressure-  Heart rate-    Respiratory rate-    Temperature-  Pulse oximetry-     Constitutional: [x] Appears well-developed and well-nourished [x] No apparent distress      [] Abnormal-   Mental status  [x] Alert and awake  [x] Oriented to person/place/time [x]Able to follow commands      Eyes:  EOM    [x]  Normal  [] Abnormal-  Sclera  [x]  Normal  [] Abnormal -         Discharge [x]  None visible  [] Abnormal -    HENT:   [x] Normocephalic, atraumatic. [] Abnormal   [x] Mouth/Throat: Mucous membranes are moist.     External Ears [x] Normal  [] Abnormal-     Neck: [x] No visualized mass     Pulmonary/Chest: [x] Respiratory effort normal.  [x] No visualized signs of difficulty breathing or respiratory distress        [] Abnormal-      Musculoskeletal:   [x] Normal gait with no signs of ataxia         [x] Normal range of motion of neck        [] Abnormal-       Neurological:        [x] No Facial Asymmetry (Cranial nerve 7 motor function) (limited exam to video visit)          [x] No gaze palsy        [] Abnormal-         Skin:        [x] No significant exanthematous lesions or discoloration noted on facial skin         [] Abnormal-            Psychiatric:       [x] Normal Affect [x] No Hallucinations        [] Abnormal-     Other pertinent observable physical exam findings-     ASSESSMENT/PLAN:  1. Irritable bowel syndrome with constipation  2. Family hx of colon cancer    - Trial of Linzess 145 mcg po daily  - Follow up in 6-8 weeks for procedure follow up  - Schedule colonoscopy  Instruct on bowel prep.    Nothing to eat or drink after midnight the day of the exam.  Unable to drive for 24 hours after the procedure. No aspirin or nonsteroidal anti-inflammatories for 5 days before procedure. I have discussed the benefits, alternatives, and risks (including bleeding, perforation and death)  for pursuing Endoscopy (EGD/Colonscopy/EUS/ERCP) with the patient and they are willing to continue. We also discussed the need for anesthesia, IV access, proper dietary changes, medication changes if necessary, and need for bowel prep (if ordered) prior to their Endoscopic procedure. They are aware they must have someone accompany them to their scheduled procedure to drive them home - they agree to the above and are willing to continue. Return for procedure follow up or sooner if needed. Opal Carbajal, was evaluated through a synchronous (real-time) audio-video encounter. The patient (or guardian if applicable) is aware that this is a billable service, which includes applicable co-pays. This Virtual Visit was conducted with patient's (and/or legal guardian's) consent. The visit was conducted pursuant to the emergency declaration under the [address] [place] [address], [address] [place] [address] authority and the [employer] and [employer] Act. Patient identification was verified, and a caregiver was present when appropriate. The patient was located at home in a state where the provider was licensed to provide care. Total time spent on this encounter: Not billed by time    --TINO Arteaga NP on 2/4/2022 at 9:09 AM    An electronic signature was used to authenticate this note.

## 2022-02-04 NOTE — PATIENT INSTRUCTIONS
Schedule colonoscopy. No aspirin, ibuprofen, naproxen, fish oil or vitamin E for 5 days before procedure. Do not eat or drink after midnight the day of the procedure. Allowed medications can be taken with a small sip of water. Please review your prep instructions for allowed medications. You will not be able to drive for 24 hours after the procedure due to sedation. You must have a responsible adult, 25 year or older, to accompany you and drive you home the day of your procedure. If you are on blood thinners, clearance from the prescribing physician will be obtained before your procedure is scheduled. If it is determined it is not safe to hold these medications for a short time an alternative procedure for evaluation may be recommended. Risks of colonoscopy include, but are not limited to, perforation, bleeding, and infection, Risk of perforation and bleeding are increased if there is a polyp removed. Anesthesia risks will be reviewed with you before the procedure by a member of the anesthesia department. Your physician may also schedule a follow up appointment with the nurse practitioner to discuss pathology, symptoms or to check if you have had any problems related to your procedure. If you prefer not to return to the office after your procedure please discuss this with your physician on the day of your colonoscopy. The physician will talk with you and/or your family after the procedure is completed. Final recommendations are based on the pathologist report if biopsies or specimens are taken. If polyps are removed during the procedure they will be sent to a pathologist for analysis. Unless you have a follow up appointment scheduled, you will be notified by mail of the pathology results within 4 weeks. If you have not received results after 4 weeks you may call the office to obtain this information. For Colonoscopy:   You will be given specific directions regarding restrictions to diet and bowel prep instructions including laxatives. Please read these instructions one week prior to your scheduled procedure to ensure that you are prepared. If you have any questions regarding these instructions please call our office Mon through Fri from 8:00 am to 4:00 pm.     Follow prep instructions provided for bowel prep. Take all of the bowel prep as directed. If you are having problems with nausea, stop your prep for 30-45 min to allow the nausea to subside before resuming your prep. It is important to drink plenty of fluids throughout the day before taking your laxatives. This will help to protect your kidneys, prevent dehydration and maximize the effect of the bowel prep. Your diet before a colonoscopy bowel preparation is very important to ensure a successful colon exam. It is recommended to consider certain changes to your diet three to four days prior to the procedure. Remember that your bowels need to be completely empty for the exam.    What foods are good to eat? Cut down on heavy solid foods three to four days before the procedure and start introducing lighter meals to your diet. The following food suggestions are a good part of your diet before a colonoscopy bowel preparation.  Light meat that is easily digestible such as chicken (without the skin)    Potatoes without skin    Cheese    Eggs    A light meal of steamed white fish    Light clear soups    Foods and drinks to avoid  Avoid foods that contain too much fiber. Stay clear of dark colored beverages. They can stick to the walls of the digestive tract and make it difficult to differentiate from blood.  Some of these foods are:   Red meat, rice, nuts and vegetables    Milk, other milk based fluids and cream    Most fruit and puddings    Whole grain pasta    Cereals, bran and seeds    Colored beverages, especially those that are red or purple in color    Red colored Jell-O     On the day before the colonoscopy, continue to drink plenty of clear fluids. It is important   to keep yourself hydrated before the exam.     Please follow all instructions as provided for cleansing the bowel. Failure to have an adequately prepped colon may cause you to have incomplete exam with further testing required.      [email or web address]

## 2022-03-07 DIAGNOSIS — Z11.52 ENCOUNTER FOR SCREENING FOR COVID-19: ICD-10-CM

## 2022-03-07 DIAGNOSIS — Z11.52 ENCOUNTER FOR SCREENING FOR COVID-19: Primary | ICD-10-CM

## 2022-03-07 LAB — SARS-COV-2, PCR: NOT DETECTED

## 2022-03-09 ENCOUNTER — ANESTHESIA EVENT (OUTPATIENT)
Dept: OPERATING ROOM | Age: 52
End: 2022-03-09

## 2022-03-09 ENCOUNTER — APPOINTMENT (OUTPATIENT)
Dept: OPERATING ROOM | Age: 52
End: 2022-03-09

## 2022-03-09 ENCOUNTER — ANESTHESIA (OUTPATIENT)
Dept: OPERATING ROOM | Age: 52
End: 2022-03-09

## 2022-03-09 ENCOUNTER — HOSPITAL ENCOUNTER (OUTPATIENT)
Age: 52
Setting detail: OUTPATIENT SURGERY
Discharge: HOME | End: 2022-03-09
Attending: INTERNAL MEDICINE | Admitting: INTERNAL MEDICINE
Payer: COMMERCIAL

## 2022-03-09 VITALS
RESPIRATION RATE: 21 BRPM | OXYGEN SATURATION: 95 % | SYSTOLIC BLOOD PRESSURE: 106 MMHG | DIASTOLIC BLOOD PRESSURE: 55 MMHG

## 2022-03-09 VITALS
HEIGHT: 67 IN | SYSTOLIC BLOOD PRESSURE: 135 MMHG | HEART RATE: 76 BPM | WEIGHT: 172 LBS | BODY MASS INDEX: 27 KG/M2 | OXYGEN SATURATION: 99 % | DIASTOLIC BLOOD PRESSURE: 88 MMHG | TEMPERATURE: 98 F | RESPIRATION RATE: 20 BRPM

## 2022-03-09 PROCEDURE — 45378 DIAGNOSTIC COLONOSCOPY: CPT

## 2022-03-09 PROCEDURE — 45378 DIAGNOSTIC COLONOSCOPY: CPT | Performed by: INTERNAL MEDICINE

## 2022-03-09 RX ORDER — PROPOFOL 10 MG/ML
INJECTION, EMULSION INTRAVENOUS PRN
Status: DISCONTINUED | OUTPATIENT
Start: 2022-03-09 | End: 2022-03-09 | Stop reason: SDUPTHER

## 2022-03-09 RX ORDER — LIDOCAINE HYDROCHLORIDE 10 MG/ML
INJECTION, SOLUTION INFILTRATION; PERINEURAL PRN
Status: DISCONTINUED | OUTPATIENT
Start: 2022-03-09 | End: 2022-03-09 | Stop reason: SDUPTHER

## 2022-03-09 RX ORDER — SODIUM CHLORIDE 9 MG/ML
INJECTION, SOLUTION INTRAVENOUS CONTINUOUS
Status: DISCONTINUED | OUTPATIENT
Start: 2022-03-09 | End: 2022-03-09 | Stop reason: HOSPADM

## 2022-03-09 RX ADMIN — PROPOFOL 50 MG: 10 INJECTION, EMULSION INTRAVENOUS at 09:15

## 2022-03-09 RX ADMIN — PROPOFOL 50 MG: 10 INJECTION, EMULSION INTRAVENOUS at 09:10

## 2022-03-09 RX ADMIN — LIDOCAINE HYDROCHLORIDE 50 MG: 10 INJECTION, SOLUTION INFILTRATION; PERINEURAL at 08:56

## 2022-03-09 RX ADMIN — PROPOFOL 50 MG: 10 INJECTION, EMULSION INTRAVENOUS at 09:19

## 2022-03-09 RX ADMIN — PROPOFOL 50 MG: 10 INJECTION, EMULSION INTRAVENOUS at 09:03

## 2022-03-09 RX ADMIN — SODIUM CHLORIDE: 9 INJECTION, SOLUTION INTRAVENOUS at 08:17

## 2022-03-09 RX ADMIN — PROPOFOL 50 MG: 10 INJECTION, EMULSION INTRAVENOUS at 08:56

## 2022-03-09 RX ADMIN — PROPOFOL 50 MG: 10 INJECTION, EMULSION INTRAVENOUS at 09:09

## 2022-03-09 ASSESSMENT — LIFESTYLE VARIABLES: SMOKING_STATUS: 1

## 2022-03-09 NOTE — OP NOTE
Patient: Josi Barbosa : 1970  Med Rec#: [id number] Acc#: [id number]   Primary Care Provider TINO Maria    Date of Procedure:  3/9/2022    Endoscopist: Melva Patterson MD, MD    Referring Provider: TINO Maria,     Operation Performed: Colonoscopy up to the cecum    Indications: 1. Irritable bowel syndrome with constipation  2. Family hx of colon cancer  3. Recent change in bowel habits  4. History of COVID-19    Anesthesia:  Sedation was administered by anesthesia who monitored the patient during the procedure. I met with Josi Brabosa prior to procedure. We discussed the procedure itself, and I have discussed the risks of endoscopy (including-- but not limited to-- pain, discomfort, bleeding potentially requiring second endoscopic procedure and/or blood transfusion, organ perforation requiring operative repair, damage to organs near the colon, infection, aspiration, cardiopulmonary/allergic reaction), benefits, indications to endoscopy. Additionally, we discussed options other than colonoscopy. The patient expressed understanding. All questions answered. The patient decided to proceed with the procedure. Signed informed consent was placed on the chart. Blood Loss: minimal    Withdrawal time: More than 6 minutes  Bowel Prep: adequate     Complications: no immediate complications    DESCRIPTION OF PROCEDURE:     A time out was performed. After written informed consent was obtained, the patient was placed in the left lateral position. The perianal area was inspected, and a digital rectal exam was performed. A rectal exam was performed: normal tone, no palpable lesions. At this point, a forward viewing Olympus colonoscope was inserted into the anus and carefully advanced to the cecum. The cecum was identified by the ileocecal valve and the appendiceal orifice.  The colonoscope was then slowly withdrawn with careful inspection of the mucosa in a linear and circumferential fashion. The scope was retroflexed in the rectum. Suction was utilized during the procedure to remove as much air as possible from the bowel. The colonoscope was removed from the patient, and the procedure was terminated. Findings are listed below. Findings: The mucosa appeared normal throughout the entire examined colon. Moderate diverticulosis in the left colon. A redundant, tortuous colon likely due to chronic constipation which made the procedure somewhat technically challenging. Grade 2 nonbleeding internal hemorrhoids on retroflex exam of the rectum. Retroflexion in the rectum was otherwise normal and revealed no further abnormalities     Recommendations:  1. Repeat colonoscopy: Due to family history, in 5 years for colorectal cancer screening; sooner if her personal or family history as pertaining to colorectal cancer risk changes requiring an earlier exam or if the patient were to develop lower GI symptoms such as bleeding, abdominal pain, change in bowel habits or stool caliber or if the patient has anemia or unexplained weight loss in the future. 2. - Resume previous meds and diet  - GI clinic f/u 6 weeks  - Keep scheduled f/u appts with other MDs     Findings and recommendations were discussed w/ the patient. A copy of the images was provided.     Meghan Gold MD, MD  3/9/2022  8:50 AM

## 2022-03-09 NOTE — H&P
Patient Name: Fatoumata Ravi  : 1970  MRN: [id number]  DATE: 22    Allergies: Allergies   Allergen Reactions    Codeine     Sulfa Antibiotics         ENDOSCOPY  History and Physical    Procedure:    [x] Diagnostic Colonoscopy       [] Screening Colonoscopy  [] EGD      [] ERCP      [] EUS       [] Other    [x] Previous office notes/History and Physical reviewed from the patients chart. Please see EMR for further details of HPI. I have examined the patient's status immediately prior to the procedure and:      Indications/HPI: 1. Irritable bowel syndrome with constipation  2. Family hx of colon cancer  3. Recent change in bowel habits  4. History of COVID-19    []Abdominal Pain   []Cancer- GI/Lung     []Fhx of colon CA/polyps  []History of Polyps  []Barretts            []Melena  []Abnormal Imaging              []Dysphagia              []Persistent Pneumonia   []Anemia                            []Food Impaction        []History of Polyps  [] GI Bleed             []Pulmonary nodule/Mass   []Change in bowel habits []Heartburn/Reflux  []Rectal Bleed (BRBPR)  []Chest Pain - Non Cardiac []Heme (+) Stool []Ulcers  []Constipation  []Hemoptysis  []Varices  []Diarrhea  []Hypoxemia    []Nausea/Vomiting   []Screening   []Crohns/Colitis  []Other:     Anesthesia:   [x] MAC [] Moderate Sedation   [] General   [] None     ROS: 12 pt Review of Symptoms was negative unless mentioned above    Medications:   Prior to Admission medications    Medication Sig Start Date End Date Taking?  Authorizing Provider   linaclotide [facility]) 145 MCG capsule Take 1 capsule by mouth every morning (before breakfast) 22   TINO De Paz - NP   CRANBERRY PO Take by mouth daily    Historical Provider, MD   APPLE CIDER VINEGAR PO Take by mouth daily    Historical Provider, MD   KRILL OIL PO Take by mouth as needed    Historical Provider, MD   ZINC PO Take by mouth as needed    Historical Provider, MD Brennan Estrada, Crista sinensis, (GREEN TEA PO) Take by mouth as needed    Historical Provider, MD   vitamin C (ASCORBIC ACID) 500 MG tablet Take 500 mg by mouth daily    Historical Provider, MD   nicotine (NICODERM CQ) 21 MG/24HR Place 1 patch onto the skin daily  Patient not taking: Reported on 2022  TINO Peralta   nicotine (Giovani Andes) 14 MG/24HR Place 1 patch onto the skin daily  Patient not taking: Reported on 2022  TINO Peralta   nicotine (NICODERM CQ) 7 MG/24HR Place 1 patch onto the skin daily  Patient not taking: Reported on 2022  TINO Peralta   venlafaxine (EFFEXOR XR) 75 MG extended release capsule Take 1 capsule by mouth daily 22   TINO Peralta   Cholecalciferol (VITAMIN D3) 5000 units CAPS Take 5,000 Units by mouth daily     Historical Provider, MD   NASONEX 50 MCG/ACT nasal spray INSTILL 2 SPRAYS IN [facility] NOSTIL DAILY  Patient taking differently: as needed  16   TINO Thakkar       Past Medical History:  Past Medical History:   Diagnosis Date    Allergic rhinitis     Anxiety     Depression     Mitral valve prolapse        Past Surgical History:  Past Surgical History:   Procedure Laterality Date    CARPAL TUNNEL RELEASE Right      SECTION      NASAL FRACTURE SURGERY      URETHRA SURGERY         Social History:  Social History     Tobacco Use    Smoking status: Current Every Day Smoker     Packs/day: 0.50     Years: 20.00     Pack years: 10.00     Types: Cigarettes     Start date: 1989    Smokeless tobacco: Never Used   Vaping Use    Vaping Use: Never used   Substance Use Topics    Alcohol use: Yes     Comment: rare    Drug use: No       Vital Signs:   Vitals:    22 0745   BP: (!) 154/91   Pulse: 82   Resp: 12   Temp: 98.5 °F (36.9 °C)   SpO2: 99%        Physical Exam:  Cardiac:  [x]WNL  []Comments:  Pulmonary:  [x]WNL   []Comments:  Neuro/Mental Status:  [x]WNL []Comments:  Abdominal:  [x]WNL    []Comments:  Other:   []WNL  []Comments:    Informed Consent:  The risks and benefits of the procedure have been discussed with either the patient or if they cannot consent, their representative. Assessment:  Patient examined and appropriate for planned sedation and procedure. Plan:  Proceed with planned sedation and procedure as above.          Ondina Hernandez MD

## 2022-03-09 NOTE — ANESTHESIA POSTPROCEDURE EVALUATION
Department of Anesthesiology  Postprocedure Note    Patient: Teo Cerrato  MRN: [id number]  YOB: 1970  Date of evaluation: 3/9/2022  Time:  9:23 AM     Procedure Summary     Date: 03/09/22 Room / Location: [facility] ENDO 02 / [address]    Anesthesia Start: 7896 Anesthesia Stop: 5853    Procedure: COLONOSCOPY DIAGNOSTIC (N/A Abdomen) Diagnosis: (CHR CONSTIP, ABD PAIN, BLOATING)    Surgeons: Castro Adam MD Responsible Provider: TINO Munguia CRNA    Anesthesia Type: general, TIVA ASA Status: 2          Anesthesia Type: general, TIVA    Alexi Phase I:      Alexi Phase II:      Last vitals: Reviewed and per EMR flowsheets.        Anesthesia Post Evaluation    Patient location during evaluation: bedside  Patient participation: complete - patient participated  Level of consciousness: awake and alert  Pain score: 0  Airway patency: patent  Nausea & Vomiting: no nausea and no vomiting  Complications: no  Cardiovascular status: blood pressure returned to baseline  Respiratory status: acceptable  Hydration status: stable

## 2022-03-09 NOTE — ANESTHESIA PRE PROCEDURE
Department of Anesthesiology  Preprocedure Note       Name:  Tessa José   Age:  45 y.o.  :  1970                                          MRN:  [id number]         Date:  3/9/2022      Surgeon: Venus Gomez):  Zachery العراقي MD    Procedure: Procedure(s):  COLONOSCOPY DIAGNOSTIC    Medications prior to admission:   Prior to Admission medications    Medication Sig Start Date End Date Taking? Authorizing Provider   linaclotide [facility]) 145 MCG capsule Take 1 capsule by mouth every morning (before breakfast) 22   TINO Wakefield - NP   CRANBERRY PO Take by mouth daily    Historical Provider, MD   APPLE CIDER VINEGAR PO Take by mouth daily    Historical Provider, MD   KRILL OIL PO Take by mouth as needed    Historical Provider, MD   ZINC PO Take by mouth as needed    Historical Provider, MD   [place], Crista sinensis, (GREEN TEA PO) Take by mouth as needed    Historical Provider, MD   vitamin C (ASCORBIC ACID) 500 MG tablet Take 500 mg by mouth daily    Historical Provider, MD   nicotine (NICODERM CQ) 21 MG/24HR Place 1 patch onto the skin daily  Patient not taking: Reported on 2022  TINO Peralta   nicotine (NICODERM CQ) 14 MG/24HR Place 1 patch onto the skin daily  Patient not taking: Reported on 2022  TINO Peralta   nicotine (NICODERM CQ) 7 MG/24HR Place 1 patch onto the skin daily  Patient not taking: Reported on 2022  TINO Oliveira   venlafaxine (EFFEXOR XR) 75 MG extended release capsule Take 1 capsule by mouth daily 22   TINO Peralta   Cholecalciferol (VITAMIN D3) 5000 units CAPS Take 5,000 Units by mouth daily     Historical Provider, MD   NASONEX 50 MCG/ACT nasal spray INSTILL 2 SPRAYS IN [facility] NOSTIL DAILY  Patient taking differently: as needed  16   TINO Lin       Current medications:    No current facility-administered medications for this encounter. Allergies:     Allergies Allergen Reactions    Codeine     Sulfa Antibiotics        Problem List:  There is no problem list on file for this patient. Past Medical History:        Diagnosis Date    Allergic rhinitis     Anxiety     Depression     Mitral valve prolapse        Past Surgical History:        Procedure Laterality Date    CARPAL TUNNEL RELEASE Right      SECTION      NASAL FRACTURE SURGERY      URETHRA SURGERY         Social History:    Social History     Tobacco Use    Smoking status: Current Every Day Smoker     Packs/day: 0.50     Years: 20.00     Pack years: 10.00     Types: Cigarettes     Start date: 1989    Smokeless tobacco: Never Used   Substance Use Topics    Alcohol use: Yes     Comment: rare                                Ready to quit: Not Answered  Counseling given: Not Answered      Vital Signs (Current):   Vitals:    22 0745   BP: (!) 154/91   Pulse: 82   Resp: 12   Temp: 98.5 °F (36.9 °C)   TempSrc: Temporal   SpO2: 99%   Weight: 172 lb (78 kg)   Height: 5' 7\" (1.702 m)                                              BP Readings from Last 3 Encounters:   22 (!) 154/91   22 134/80   19 (!) 132/90       NPO Status: Time of last liquid consumption: 0300 (water)                        Time of last solid consumption:                         Date of last liquid consumption: 22                        Date of last solid food consumption: 22    BMI:   Wt Readings from Last 3 Encounters:   22 172 lb (78 kg)   22 180 lb (81.6 kg)   19 175 lb 4 oz (79.5 kg)     Body mass index is 26.94 kg/m².     CBC:   Lab Results   Component Value Date    WBC 4.3 2022    RBC 4.58 2022    HGB 14.4 2022    HCT 44.3 2022    MCV 96.7 2022    RDW 12.7 2022     2022       CMP:   Lab Results   Component Value Date     2022    K 4.4 2022     2022    CO2 26 2022    BUN 9 2022 CREATININE 0.7 01/19/2022    GFRAA >59 01/19/2022    LABGLOM >60 01/19/2022    GLUCOSE 87 01/19/2022    PROT 7.1 01/19/2022    CALCIUM 9.3 01/19/2022    BILITOT <0.2 01/19/2022    ALKPHOS 49 01/19/2022    AST 21 01/19/2022    ALT 11 01/19/2022       POC Tests: No results for input(s): POCGLU, POCNA, POCK, POCCL, POCBUN, POCHEMO, POCHCT in the last 72 hours. Coags: No results found for: PROTIME, INR, APTT    HCG (If Applicable): No results found for: PREGTESTUR, PREGSERUM, HCG, HCGQUANT     ABGs: No results found for: PHART, PO2ART, GIT8NNO, YVA7RVD, BEART, I3ZSAZEZ     Type & Screen (If Applicable):  No results found for: LABABO, LABRH    Drug/Infectious Status (If Applicable):  No results found for: HIV, HEPCAB    COVID-19 Screening (If Applicable):   Lab Results   Component Value Date    COVID19 Not Detected 03/07/2022           Anesthesia Evaluation  Patient summary reviewed and Nursing notes reviewed no history of anesthetic complications:   Airway: Mallampati: I  TM distance: >3 FB   Neck ROM: full  Mouth opening: > = 3 FB Dental: normal exam         Pulmonary:Negative Pulmonary ROS and normal exam    (+) current smoker          Patient smoked on day of surgery. Cardiovascular:Negative CV ROS    (+) valvular problems/murmurs: MVP,                   Neuro/Psych:   (+) psychiatric history:            GI/Hepatic/Renal: Neg GI/Hepatic/Renal ROS            Endo/Other: Negative Endo/Other ROS                    Abdominal:             Vascular: negative vascular ROS. Other Findings:           Anesthesia Plan      general and TIVA     ASA 2       Induction: intravenous. Anesthetic plan and risks discussed with patient.                     TINO Lopez - CRNA   3/9/2022

## 2022-03-21 ENCOUNTER — E-VISIT (OUTPATIENT)
Dept: FAMILY MEDICINE CLINIC | Age: 52
End: 2022-03-21
Payer: COMMERCIAL

## 2022-03-21 DIAGNOSIS — S20.161A TICK BITE OF RIGHT BREAST, INITIAL ENCOUNTER: Primary | ICD-10-CM
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
## 2022-03-21 PROCEDURE — 99421 OL DIG E/M SVC 5-10 MIN: CPT | Performed by: NURSE PRACTITIONER

## 2022-03-21 RX ORDER — DOXYCYCLINE HYCLATE 100 MG
100 TABLET ORAL 2 TIMES DAILY
Qty: 20 TABLET | Refills: 0 | Status: SHIPPED | OUTPATIENT
Start: 2022-03-21 | End: 2022-03-31

## 2022-03-21 NOTE — PROGRESS NOTES
S: 5day onset of rash noted after tick removed. Area is right breast. Pink in color reported. No fevers reported. O: Evisit only. 1. Tick bite of right breast, initial encounter    - doxycycline hyclate (VIBRA-TABS) 100 MG tablet; Take 1 tablet by mouth 2 times daily for 10 days  Dispense: 20 tablet; Refill: 0  Hey,   I have reviewed your answers and feel it is safest to begin Doxycycline antibiotic twice daily for 10days (take with food). I will send it to your listed pharmacy. Please follow up in person if you are not seeing resolution of symptoms within 4-5days. Thanks,   Violetta    5-10 minutes were spent on the digital evaluation and management of this patient.

## 2022-04-18 DIAGNOSIS — Z72.0 TOBACCO ABUSE: ICD-10-CM

## 2022-04-18 RX ORDER — BUPROPION HYDROCHLORIDE 75 MG/1
TABLET ORAL
Qty: 60 TABLET | Refills: 2 | Status: SHIPPED | OUTPATIENT
Start: 2022-04-18

## 2022-04-22 ENCOUNTER — TELEMEDICINE (OUTPATIENT)
Dept: GASTROENTEROLOGY | Age: 52
End: 2022-04-22
Payer: COMMERCIAL

## 2022-04-22 DIAGNOSIS — Z80.0 FAMILY HX OF COLON CANCER: ICD-10-CM

## 2022-04-22 DIAGNOSIS — K58.1 IRRITABLE BOWEL SYNDROME WITH CONSTIPATION: Primary | ICD-10-CM

## 2022-04-22 PROCEDURE — 99214 OFFICE O/P EST MOD 30 MIN: CPT | Performed by: NURSE PRACTITIONER

## 2022-04-22 ASSESSMENT — ENCOUNTER SYMPTOMS
COUGH: 0
VOMITING: 0
BLOOD IN STOOL: 0
TROUBLE SWALLOWING: 0
NAUSEA: 0
SHORTNESS OF BREATH: 0
CHOKING: 0
ABDOMINAL DISTENTION: 1
RECTAL PAIN: 0
ANAL BLEEDING: 0
CONSTIPATION: 1
DIARRHEA: 0
ABDOMINAL PAIN: 1

## 2022-04-22 NOTE — PATIENT INSTRUCTIONS
Patient Education        linaclotide  Pronunciation: IRAJ srivastava KLOE tide  Brand: Linzess  What is the most important information I should know about linaclotide? You should not use linaclotide if you have a blockage in your intestines. Linaclotide is not approved for use by anyone younger than 25years old. Linaclotide should not be given to a child younger than 10years old. Linaclotide can cause severe diarrhea and fatal dehydration in a child. What is linaclotide? Linaclotide is used to treat chronic constipation, or chronic irritable bowelsyndrome (IBS) in people who have had constipation as the main symptom. Linaclotide may also be used for purposes not listed in this medication guide. What should I discuss with my healthcare provider before taking linaclotide? You should not use linaclotide if you are allergic to it, or if you have:   a blockage in your intestines (bowel obstruction). Linaclotide is not approved for use by anyone younger than 25years old. Linaclotide should not be given to a child younger than 10years old. Linaclotide can cause severe diarrhea and fatal dehydration in a child. Do notgive this medicine to any child or teenager without the advice of a doctor. Tell your doctor if you are pregnant or breastfeeding. How should I take linaclotide? Follow all directions on your prescription label and read all medication guidesor instruction sheets. Use the medicine exactly as directed. Take linaclotide in the morning on an empty stomach, at least 30 minutes beforeyour first meal.  Swallow the capsule whole and do not crush, chew, break, or open it. If you cannot swallow a capsule whole, open it and sprinkle the medicine into a spoonful of applesauce or bottled water. Swallow the mixture right away without chewing. Do not save it for lateruse.   Even if you have taken this medicine with applesauce, wait at least 30 minutesbefore eating a full meal.  If needed, medicine from the linaclotide capsule may be given through anasogastric (NG) or gastronomy tube. Carefully follow instructions for mixing medicine from the capsule with applesauce or water, or giving the medicine through a feeding tube. Ask your doctor or pharmacist if you have any questions. It may take up to 2 weeks before your symptoms improve. Keep using themedication as directed and tell your doctor if your symptoms do not improve. Store at room temperature away from moisture and heat. Keep the tablets in their original container, along with the packet or canister ofmoisture-absorbing preservative. Keep this medicine out of the reach of children. Linaclotide can be fatal to a child who accidentally swallows this medicine. Seek emergency medical attention if this happens. What happens if I miss a dose? Skip the missed dose and use your next dose at the regular time. Do not use two doses at one time. What happens if I overdose? Seek emergency medical attention or call the Poison Help line at 1-579.807.1991. What should I avoid while taking linaclotide? Follow your doctor's instructions about any restrictions on food, beverages, oractivity. What are the possible side effects of linaclotide? Get emergency medical help if you have signs of an allergic reaction: hives; difficult breathing; swelling of your face, lips, tongue, or throat. Stop using linaclotide and call your doctor at once if you have:   severe or ongoing diarrhea; or   diarrhea with dizziness or a light-headed feeling (like you might pass out). Common side effects may include:   diarrhea;   stomach pain;   gas; or   bloating or full feeling in your stomach. This is not a complete list of side effects and others may occur. Call your doctor for medical advice about side effects. You may report side effects toFDA at 7-113-HKX-4204. What other drugs will affect linaclotide?   Other drugs may affect linaclotide, including prescription and over-the-counter medicines, vitamins, and herbal products. Tell your doctor about all yourcurrent medicines and any medicine you start or stop using. Where can I get more information? Your pharmacist can provide more information about linaclotide. Remember, keep this and all other medicines out of the reach of children, never share your medicines with others, and use this medication only for the indication prescribed. Every effort has been made to ensure that the information provided by [address] is accurate, up-to-date, and complete, but no guarantee is made to that effect. Drug information contained herein may be time sensitive. [facility] information has been compiled for use by healthcare practitioners and consumers in the [place] and therefore [facility] does not warrant that uses outside of the [place] are appropriate, unless specifically indicated otherwise. [facility]'s drug information does not endorse drugs, diagnose patients or recommend therapy. [facility]'s drug information is an informational resource designed to assist licensed healthcare practitioners in caring for their patients and/or to serve consumers viewing this service as a supplement to, and not a substitute for, the expertise, skill, knowledge and judgment of healthcare practitioners. The absence of a warning for a given drug or drug combination in no way should be construed to indicate that the drug or drug combination is safe, effective or appropriate for any given patient. [facility] does not assume any responsibility for any aspect of healthcare administered with the aid of information [facility] provides. The information contained herein is not intended to cover all possible uses, directions, precautions, warnings, drug interactions, allergic reactions, or adverse effects.  If you have questions about the drugs you are taking, check with yourdoctor, nurse or pharmacist.  Copyright 5556-8679 [address]: 5.01. Revision date: 10/2/2020. Care instructions adapted under license by [facility] ([facility]). If you have questions about a medical condition or this instruction, always ask your healthcare professional. [address] any warranty or liability for your use of this information.

## 2022-04-22 NOTE — PROGRESS NOTES
2022    TELEHEALTH EVALUATION -- Audio/Visual (During DWVCL-55 public health emergency)    HPI:    Jemma  (:  1970) has requested an audio/video evaluation for the following concern(s):  Chief Complaint   Patient presents with    Follow-up     Pt seen today for follow up after Colonoscopy on 3/9/22 for c/o changes in bowel habits with recent increase in diarrhea, followed by constipation. Linzess 145 mcg po daily was recommended at last visit. Today, she states constipation has improved. However, she will go up to 5 days without a BM. She states she averages BM every 3 days. This is an improvement. She will have some abdominal discomfort between BMs at times. Colonoscopy Findings 3/9/22: The mucosa appeared normal throughout the entire examined colon. Moderate diverticulosis in the left colon. A redundant, tortuous colon likely due to chronic constipation which made the procedure somewhat technically challenging. Grade 2 nonbleeding internal hemorrhoids on retroflex exam of the rectum. Retroflexion in the rectum was otherwise normal and revealed no further abnormalities   Recommendations:  1. Repeat colonoscopy: Due to family history, in 5 years for colorectal cancer screening; sooner if her personal or family history as pertaining to colorectal cancer risk changes requiring an earlier exam or if the patient were to develop lower GI symptoms such as bleeding, abdominal pain, change in bowel habits or stool caliber or if the patient has anemia or unexplained weight loss in the future. 2. - Resume previous meds and diet  - GI clinic f/u 6 weeks  - Keep scheduled f/u appts with other MDs       Review of Systems   Constitutional: Negative for activity change, appetite change, fatigue, fever and unexpected weight change. HENT: Negative for trouble swallowing. Respiratory: Negative for cough, choking and shortness of breath. Cardiovascular: Negative for chest pain. Gastrointestinal: Positive for abdominal distention (bloating), abdominal pain and constipation. Negative for anal bleeding, blood in stool, diarrhea, nausea, rectal pain and vomiting. Allergic/Immunologic: Negative for food allergies. All other systems reviewed and are negative. Prior to Visit Medications    Medication Sig Taking?  Authorizing Provider   linaclotide (LINZESS) 290 MCG CAPS capsule Take 1 capsule by mouth every morning (before breakfast) Yes TINO Mckenna - AUGUSTUS   buPROPion (WELLBUTRIN) 75 MG tablet TAKE 1 TABLET BY MOUTH EVERY MORNING FOR 2 WEEKS THEN INCREASE TO 1 TWICE DAILY AS DIRECTED  TINO Peralta   CRANBERRY PO Take by mouth as needed   Historical Provider, MD   APPLE CIDER VINEGAR PO Take by mouth as needed   Historical Provider, MD   KRILL OIL PO Take by mouth as needed  Historical Provider, MD   ZINC PO Take by mouth as needed  Historical Provider, MD   nicotine (Emily Ga) 14 MG/24HR Place 1 patch onto the skin daily  Patient not taking: Reported on 2/2/2022  TINO Peralta   nicotine (NICODERM CQ) 7 MG/24HR Place 1 patch onto the skin daily  Patient not taking: Reported on 2/2/2022  TINO Peralta   venlafaxine (EFFEXOR XR) 75 MG extended release capsule Take 1 capsule by mouth daily  TINO Peralta   NASONEX 50 MCG/ACT nasal spray INSTILL 2 SPRAYS IN EACH NOSTIL DAILY  Patient taking differently: as needed   TINO Miguel       Social History     Tobacco Use    Smoking status: Current Every Day Smoker     Packs/day: 0.50     Years: 20.00     Pack years: 10.00     Types: Cigarettes     Start date: 5/29/1989    Smokeless tobacco: Never Used   Vaping Use    Vaping Use: Never used   Substance Use Topics    Alcohol use: Yes     Comment: rare    Drug use: No        Allergies   Allergen Reactions    Codeine     Sulfa Antibiotics    ,   Past Medical History:   Diagnosis Date    Allergic rhinitis     Anxiety     Depression     Mitral valve prolapse ,   Past Surgical History:   Procedure Laterality Date    CARPAL TUNNEL RELEASE Right      SECTION      COLONOSCOPY N/A 2022    Dr Catherine Emmanuel, Mod diverticulosis, redundant tortuous colon, int hemorrhoids Gr 2 wo bleeding, 5 year recall    NASAL FRACTURE SURGERY      URETHRA SURGERY     ,   Social History     Tobacco Use    Smoking status: Current Every Day Smoker     Packs/day: 0.50     Years: 20.00     Pack years: 10.00     Types: Cigarettes     Start date: 1989    Smokeless tobacco: Never Used   Vaping Use    Vaping Use: Never used   Substance Use Topics    Alcohol use: Yes     Comment: rare    Drug use: No   ,   Family History   Problem Relation Age of Onset    Asthma Mother     Asthma Brother     Heart Disease Paternal Aunt     Asthma Child     Colon Cancer Father     Esophageal Cancer Neg Hx     Liver Cancer Neg Hx     Rectal Cancer Neg Hx     Stomach Cancer Neg Hx        PHYSICAL EXAMINATION:  [ INSTRUCTIONS:  \"[x]\" Indicates a positive item  \"[]\" Indicates a negative item  -- DELETE ALL ITEMS NOT EXAMINED]  Vital Signs: (As obtained by patient/caregiver or practitioner observation)    Blood pressure-  Heart rate-    Respiratory rate-    Temperature-  Pulse oximetry-     Constitutional: [x] Appears well-developed and well-nourished [x] No apparent distress      [] Abnormal-   Mental status  [x] Alert and awake  [x] Oriented to person/place/time [x]Able to follow commands      Eyes:  EOM    [x]  Normal  [] Abnormal-  Sclera  [x]  Normal  [] Abnormal -         Discharge [x]  None visible  [] Abnormal -    HENT:   [x] Normocephalic, atraumatic.   [] Abnormal   [x] Mouth/Throat: Mucous membranes are moist.     External Ears [x] Normal  [] Abnormal-     Neck: [x] No visualized mass     Pulmonary/Chest: [x] Respiratory effort normal.  [x] No visualized signs of difficulty breathing or respiratory distress        [] Abnormal-      Musculoskeletal:   [x] Normal gait with no signs of ataxia         [x] Normal range of motion of neck        [] Abnormal-       Neurological:        [x] No Facial Asymmetry (Cranial nerve 7 motor function) (limited exam to video visit)          [x] No gaze palsy        [] Abnormal-         Skin:        [x] No significant exanthematous lesions or discoloration noted on facial skin         [] Abnormal-            Psychiatric:       [x] Normal Affect [x] No Hallucinations        [] Abnormal-     Other pertinent observable physical exam findings-     ASSESSMENT/PLAN:  1. Irritable bowel syndrome with constipation  2. Family hx of colon cancer    - Increase Linzess to 290 mcg po daily  - Follow up in 6 weeks to check medication efficacy  - Call with any questions or concerns      Total time spent on this encounter: Not billed by time    --TINO Gibson - NP on 4/22/2022 at 9:58 AM    An electronic signature was used to authenticate this note.

## 2022-06-02 ASSESSMENT — ENCOUNTER SYMPTOMS
VOMITING: 0
NAUSEA: 0
DIARRHEA: 0
TROUBLE SWALLOWING: 0
BLOOD IN STOOL: 0
ABDOMINAL PAIN: 0
CHOKING: 0
ABDOMINAL DISTENTION: 0
COUGH: 0
ANAL BLEEDING: 0
RECTAL PAIN: 0
SHORTNESS OF BREATH: 0

## 2022-06-02 NOTE — PROGRESS NOTES
6/3/2022    TELEHEALTH EVALUATION -- Audio/Visual (During UUYTW-52 public health emergency)    HPI:    Jenny Son (:  1970) has requested an audio/video evaluation for the following concern(s):  Chief Complaint   Patient presents with    Follow-up       Pt seen today for follow up. Hx IBS with constipation. Linzess increased to 290 mcg po daily at last visit. She reports symptoms are currently manageable. She reports 4 BMs a week. She denies any other issues or concerns today. Last Colonoscopy 3/2022 - Mod diverticulosis, redundant tortuous colon, int hemorrhoids Gr 2 wo bleeding, 5 year recall  Family hx colon cancer - Father    Review of Systems   Constitutional: Negative for activity change, appetite change, fatigue, fever and unexpected weight change. HENT: Negative for trouble swallowing. Respiratory: Negative for cough, choking and shortness of breath. Cardiovascular: Negative for chest pain. Gastrointestinal: Positive for constipation (chronic, improved). Negative for abdominal distention, abdominal pain, anal bleeding, blood in stool, diarrhea, nausea, rectal pain and vomiting. Allergic/Immunologic: Negative for food allergies. All other systems reviewed and are negative. Prior to Visit Medications    Medication Sig Taking?  Authorizing Provider   linaclotide (LINZESS) 290 MCG CAPS capsule Take 1 capsule by mouth every morning (before breakfast)  Heidi Garza APRN - NP   buPROPion (WELLBUTRIN) 75 MG tablet TAKE 1 TABLET BY MOUTH EVERY MORNING FOR 2 WEEKS THEN INCREASE TO 1 TWICE DAILY AS DIRECTED  TINO Peralta   CRANBERRY PO Take by mouth as needed   Historical Provider, MD   APPLE CIDER VINEGAR PO Take by mouth as needed   Historical Provider, MD   KRILL OIL PO Take by mouth as needed  Historical Provider, MD   ZINC PO Take by mouth as needed  Historical Provider, MD   nicotine (NICODERM CQ) 14 MG/24HR Place 1 patch onto the skin daily  Patient not taking: Reported on 2022  TINO Peralta   nicotine (NICODERM CQ) 7 MG/24HR Place 1 patch onto the skin daily  Patient not taking: Reported on 2022  TINO Peralta   venlafaxine (EFFEXOR XR) 75 MG extended release capsule Take 1 capsule by mouth daily  TINO Peralta   NASONEX 50 MCG/ACT nasal spray INSTILL 2 SPRAYS IN EACH NOSTIL DAILY  Patient taking differently: as needed   TINO Weber       Social History     Tobacco Use    Smoking status: Current Every Day Smoker     Packs/day: 0.50     Years: 20.00     Pack years: 10.00     Types: Cigarettes     Start date: 1989    Smokeless tobacco: Never Used   Vaping Use    Vaping Use: Never used   Substance Use Topics    Alcohol use: Yes     Comment: rare    Drug use: No        Allergies   Allergen Reactions    Codeine     Sulfa Antibiotics    ,   Past Medical History:   Diagnosis Date    Allergic rhinitis     Anxiety     Depression     Mitral valve prolapse    ,   Past Surgical History:   Procedure Laterality Date    CARPAL TUNNEL RELEASE Right      SECTION      COLONOSCOPY N/A 2022    Dr Grace Gaspar, Mod diverticulosis, redundant tortuous colon, int hemorrhoids Gr 2 wo bleeding, 5 year recall    NASAL FRACTURE SURGERY      URETHRA SURGERY     ,   Social History     Tobacco Use    Smoking status: Current Every Day Smoker     Packs/day: 0.50     Years: 20.00     Pack years: 10.00     Types: Cigarettes     Start date: 1989    Smokeless tobacco: Never Used   Vaping Use    Vaping Use: Never used   Substance Use Topics    Alcohol use: Yes     Comment: rare    Drug use: No   ,   Family History   Problem Relation Age of Onset    Asthma Mother     Asthma Brother     Heart Disease Paternal Aunt     Asthma Child     Colon Cancer Father     Esophageal Cancer Neg Hx     Liver Cancer Neg Hx     Rectal Cancer Neg Hx     Stomach Cancer Neg Hx        PHYSICAL EXAMINATION:  [ INSTRUCTIONS:  \"[x]\" Indicates a positive item  \"[]\" Indicates a negative item  -- DELETE ALL ITEMS NOT EXAMINED]  Vital Signs: (As obtained by patient/caregiver or practitioner observation)    Blood pressure-  Heart rate-    Respiratory rate-    Temperature-  Pulse oximetry-     Constitutional: [x] Appears well-developed and well-nourished [x] No apparent distress      [] Abnormal-   Mental status  [x] Alert and awake  [x] Oriented to person/place/time [x]Able to follow commands      Eyes:  EOM    [x]  Normal  [] Abnormal-  Sclera  [x]  Normal  [] Abnormal -         Discharge [x]  None visible  [] Abnormal -    HENT:   [x] Normocephalic, atraumatic. [] Abnormal   [x] Mouth/Throat: Mucous membranes are moist.     External Ears [x] Normal  [] Abnormal-     Neck: [x] No visualized mass     Pulmonary/Chest: [x] Respiratory effort normal.  [x] No visualized signs of difficulty breathing or respiratory distress        [] Abnormal-      Musculoskeletal:   [x] Normal gait with no signs of ataxia         [x] Normal range of motion of neck        [] Abnormal-       Neurological:        [x] No Facial Asymmetry (Cranial nerve 7 motor function) (limited exam to video visit)          [x] No gaze palsy        [] Abnormal-         Skin:        [x] No significant exanthematous lesions or discoloration noted on facial skin         [] Abnormal-            Psychiatric:       [x] Normal Affect [x] No Hallucinations        [] Abnormal-     Other pertinent observable physical exam findings-     ASSESSMENT/PLAN:  1. Irritable bowel syndrome with constipation    - Continue Linzess 290 mcg po daily  - Follow up yearly for medication refills   - Call with any questions or concerns   - High Fiber Diet         Return in about 1 year (around 6/3/2023). Maryam Parker, was evaluated through a synchronous (real-time) audio-video encounter. The patient (or guardian if applicable) is aware that this is a billable service, which includes applicable co-pays.  This Virtual Visit was conducted with patient's (and/or legal guardian's) consent. The visit was conducted pursuant to the emergency declaration under the [facility] [address] and the [employer] Act. Patient identification was verified, and a caregiver was present when appropriate. The patient was located at Home: [address] [place] [address]. Provider was located at Home ([facility]): [place]. Total time spent on this encounter: Not billed by time    --TINO Balderas NP on 6/3/2022 at 9:19 AM    An electronic signature was used to authenticate this note.

## 2022-06-03 ENCOUNTER — TELEMEDICINE (OUTPATIENT)
Dept: GASTROENTEROLOGY | Age: 52
End: 2022-06-03
Payer: COMMERCIAL

## 2022-06-03 DIAGNOSIS — K58.1 IRRITABLE BOWEL SYNDROME WITH CONSTIPATION: Primary | ICD-10-CM

## 2022-06-03 PROCEDURE — 99213 OFFICE O/P EST LOW 20 MIN: CPT | Performed by: NURSE PRACTITIONER

## 2022-06-03 ASSESSMENT — ENCOUNTER SYMPTOMS: CONSTIPATION: 1

## 2022-08-31 DIAGNOSIS — F32.A ANXIETY AND DEPRESSION: ICD-10-CM

## 2022-08-31 DIAGNOSIS — F41.9 ANXIETY AND DEPRESSION: ICD-10-CM

## 2022-08-31 RX ORDER — VENLAFAXINE HYDROCHLORIDE 75 MG/1
75 CAPSULE, EXTENDED RELEASE ORAL DAILY
Qty: 30 CAPSULE | Refills: 5 | Status: SHIPPED | OUTPATIENT
Start: 2022-08-31 | End: 2022-10-16 | Stop reason: SDUPTHER

## 2022-08-31 NOTE — TELEPHONE ENCOUNTER
Loren Sevilla called to request a refill on her medication.       Last office visit : 3/21/2022   Next office visit : Visit date not found     Requested Prescriptions     Pending Prescriptions Disp Refills    venlafaxine (EFFEXOR XR) 75 MG extended release capsule 30 capsule 5     Sig: Take 1 capsule by mouth daily            Noemí Villagomez CMA

## 2022-10-16 DIAGNOSIS — F41.9 ANXIETY AND DEPRESSION: ICD-10-CM

## 2022-10-16 DIAGNOSIS — F32.A ANXIETY AND DEPRESSION: ICD-10-CM

## 2022-10-17 DIAGNOSIS — F41.9 ANXIETY AND DEPRESSION: ICD-10-CM

## 2022-10-17 DIAGNOSIS — F32.A ANXIETY AND DEPRESSION: ICD-10-CM

## 2022-10-17 RX ORDER — VENLAFAXINE HYDROCHLORIDE 75 MG/1
75 CAPSULE, EXTENDED RELEASE ORAL DAILY
Qty: 90 CAPSULE | Refills: 1 | Status: SHIPPED | OUTPATIENT
Start: 2022-10-17 | End: 2022-10-17 | Stop reason: SDUPTHER

## 2022-10-17 RX ORDER — VENLAFAXINE HYDROCHLORIDE 75 MG/1
75 CAPSULE, EXTENDED RELEASE ORAL DAILY
Qty: 90 CAPSULE | Refills: 1 | Status: SHIPPED | OUTPATIENT
Start: 2022-10-17

## 2022-10-17 RX ORDER — LINACLOTIDE 290 UG/1
290 CAPSULE, GELATIN COATED ORAL
Qty: 90 CAPSULE | Refills: 3 | OUTPATIENT
Start: 2022-10-17

## 2022-10-17 NOTE — TELEPHONE ENCOUNTER
Patient's script sent to [facility] by mistake. That script was cancelled. Correct script called to [facility] ([facility]) as requested.

## 2022-10-17 NOTE — TELEPHONE ENCOUNTER
This Rx requested was already ES by [address] [employer] aprn to [facility] at one daily on 4-22-22 # 90 x 3 refills. Notified Pharmacy This Rx is good till 4-2023.  Luan stallings

## 2022-10-31 RX ORDER — LINACLOTIDE 290 UG/1
290 CAPSULE, GELATIN COATED ORAL
Qty: 90 CAPSULE | Refills: 3 | OUTPATIENT
Start: 2022-10-31

## 2022-10-31 NOTE — TELEPHONE ENCOUNTER
Last visit [facility] as V V 6-3-22. No FU scheduled. CLN  3-9-22. This Rx RF requested was ES to [facility] [facility] by [facility] on 4-22-22 at one daily # 90 x 3 refills and this is good x 1 year. Pharmacy notified.   chandrakant

## 2023-07-14 DIAGNOSIS — Z72.0 TOBACCO ABUSE: ICD-10-CM

## 2023-07-14 RX ORDER — LINACLOTIDE 290 UG/1
CAPSULE, GELATIN COATED ORAL
Qty: 90 CAPSULE | Refills: 0 | OUTPATIENT
Start: 2023-07-14

## 2023-07-14 RX ORDER — BUPROPION HYDROCHLORIDE 75 MG/1
TABLET ORAL
Qty: 14 TABLET | Refills: 0 | OUTPATIENT
Start: 2023-07-14

## 2023-07-14 NOTE — TELEPHONE ENCOUNTER
Last [facility] aprn 6-3-22. No FU scheduled. Last CLN  3-9-22. [place] WEEKSG 4-22-22 # 90 x 3. I notified the Pharmacy this patient needs OV for future medication refills.  [facility]

## 2023-07-18 ENCOUNTER — TELEMEDICINE (OUTPATIENT)
Dept: GASTROENTEROLOGY | Age: 53
End: 2023-07-18
Payer: COMMERCIAL

## 2023-07-18 DIAGNOSIS — K58.1 IRRITABLE BOWEL SYNDROME WITH CONSTIPATION: Primary | ICD-10-CM

## 2023-07-18 DIAGNOSIS — K21.9 GASTROESOPHAGEAL REFLUX DISEASE, UNSPECIFIED WHETHER ESOPHAGITIS PRESENT: ICD-10-CM

## 2023-07-18 PROCEDURE — 99214 OFFICE O/P EST MOD 30 MIN: CPT | Performed by: NURSE PRACTITIONER

## 2023-07-18 RX ORDER — LINACLOTIDE 290 UG/1
290 CAPSULE, GELATIN COATED ORAL
Qty: 90 CAPSULE | Refills: 3 | Status: SHIPPED | OUTPATIENT
Start: 2023-07-18

## 2023-07-18 RX ORDER — FAMOTIDINE 20 MG/1
20 TABLET, FILM COATED ORAL
Qty: 90 TABLET | Refills: 3 | COMMUNITY
Start: 2023-07-18

## 2023-07-18 ASSESSMENT — ENCOUNTER SYMPTOMS
DIARRHEA: 1
NAUSEA: 0
COUGH: 0
ABDOMINAL DISTENTION: 0
RECTAL PAIN: 0
SHORTNESS OF BREATH: 0
VOMITING: 0
ANAL BLEEDING: 0
BLOOD IN STOOL: 0
CONSTIPATION: 1
TROUBLE SWALLOWING: 0
CHOKING: 0
ABDOMINAL PAIN: 1

## 2023-09-22 ENCOUNTER — OFFICE VISIT (OUTPATIENT)
Dept: FAMILY MEDICINE CLINIC | Age: 53
End: 2023-09-22

## 2023-09-22 VITALS
OXYGEN SATURATION: 97 % | HEART RATE: 92 BPM | HEIGHT: 67 IN | WEIGHT: 173 LBS | SYSTOLIC BLOOD PRESSURE: 132 MMHG | TEMPERATURE: 97.4 F | RESPIRATION RATE: 20 BRPM | BODY MASS INDEX: 27.15 KG/M2 | DIASTOLIC BLOOD PRESSURE: 72 MMHG

## 2023-09-22 DIAGNOSIS — Z00.00 ANNUAL PHYSICAL EXAM: Primary | ICD-10-CM

## 2023-09-22 DIAGNOSIS — G89.29 CHRONIC BILATERAL LOW BACK PAIN WITHOUT SCIATICA: ICD-10-CM

## 2023-09-22 DIAGNOSIS — Z72.0 TOBACCO ABUSE: ICD-10-CM

## 2023-09-22 DIAGNOSIS — F32.A ANXIETY AND DEPRESSION: ICD-10-CM

## 2023-09-22 DIAGNOSIS — F41.9 ANXIETY AND DEPRESSION: ICD-10-CM

## 2023-09-22 DIAGNOSIS — Z12.31 SCREENING MAMMOGRAM, ENCOUNTER FOR: ICD-10-CM

## 2023-09-22 DIAGNOSIS — Z00.00 ANNUAL PHYSICAL EXAM: ICD-10-CM

## 2023-09-22 DIAGNOSIS — Z23 NEEDS FLU SHOT: ICD-10-CM

## 2023-09-22 DIAGNOSIS — M54.50 CHRONIC BILATERAL LOW BACK PAIN WITHOUT SCIATICA: ICD-10-CM

## 2023-09-22 DIAGNOSIS — Z87.891 PERSONAL HISTORY OF TOBACCO USE: ICD-10-CM

## 2023-09-22 LAB
25(OH)D3 SERPL-MCNC: 40.5 NG/ML
ALBUMIN SERPL-MCNC: 4.8 G/DL (ref 3.5–5.2)
ALP SERPL-CCNC: 52 U/L (ref 35–104)
ALT SERPL-CCNC: 11 U/L (ref 5–33)
ANION GAP SERPL CALCULATED.3IONS-SCNC: 16 MMOL/L (ref 7–19)
AST SERPL-CCNC: 17 U/L (ref 5–32)
BACTERIA #/AREA URNS HPF: ABNORMAL /HPF
BASOPHILS # BLD: 0.1 K/UL (ref 0–0.2)
BASOPHILS NFR BLD: 0.7 % (ref 0–1)
BILIRUB SERPL-MCNC: 0.4 MG/DL (ref 0.2–1.2)
BILIRUB UR QL STRIP: NEGATIVE
BUN SERPL-MCNC: 9 MG/DL (ref 6–20)
CALCIUM SERPL-MCNC: 9.8 MG/DL (ref 8.6–10)
CHLORIDE SERPL-SCNC: 103 MMOL/L (ref 98–111)
CHOLEST SERPL-MCNC: 249 MG/DL (ref 160–199)
CLARITY UR: CLEAR
CO2 SERPL-SCNC: 20 MMOL/L (ref 22–29)
COLOR UR: YELLOW
CREAT SERPL-MCNC: 0.7 MG/DL (ref 0.5–0.9)
EOSINOPHIL # BLD: 0.4 K/UL (ref 0–0.6)
EOSINOPHIL NFR BLD: 3.8 % (ref 0–5)
ERYTHROCYTE [DISTWIDTH] IN BLOOD BY AUTOMATED COUNT: 12.7 % (ref 11.5–14.5)
GLUCOSE SERPL-MCNC: 78 MG/DL (ref 74–109)
GLUCOSE UR STRIP.AUTO-MCNC: NEGATIVE MG/DL
HCT VFR BLD AUTO: 47.6 % (ref 37–47)
HDLC SERPL-MCNC: 58 MG/DL (ref 65–121)
HGB BLD-MCNC: 15.9 G/DL (ref 12–16)
HGB UR STRIP.AUTO-MCNC: ABNORMAL MG/L
IMM GRANULOCYTES # BLD: 0 K/UL
KETONES UR STRIP.AUTO-MCNC: ABNORMAL MG/DL
LDLC SERPL CALC-MCNC: 157 MG/DL
LEUKOCYTE ESTERASE UR QL STRIP.AUTO: ABNORMAL
LYMPHOCYTES # BLD: 3.4 K/UL (ref 1.1–4.5)
LYMPHOCYTES NFR BLD: 33.3 % (ref 20–40)
MCH RBC QN AUTO: 32.4 PG (ref 27–31)
MCHC RBC AUTO-ENTMCNC: 33.4 G/DL (ref 33–37)
MCV RBC AUTO: 96.9 FL (ref 81–99)
MONOCYTES # BLD: 0.7 K/UL (ref 0–0.9)
MONOCYTES NFR BLD: 6.8 % (ref 0–10)
NEUTROPHILS # BLD: 5.7 K/UL (ref 1.5–7.5)
NEUTS SEG NFR BLD: 55 % (ref 50–65)
NITRITE UR QL STRIP.AUTO: NEGATIVE
PH UR STRIP.AUTO: 5.5 [PH] (ref 5–8)
PLATELET # BLD AUTO: 225 K/UL (ref 130–400)
PMV BLD AUTO: 12.2 FL (ref 9.4–12.3)
POTASSIUM SERPL-SCNC: 4.3 MMOL/L (ref 3.5–5)
PROT SERPL-MCNC: 7.7 G/DL (ref 6.6–8.7)
PROT UR STRIP.AUTO-MCNC: NEGATIVE MG/DL
RBC # BLD AUTO: 4.91 M/UL (ref 4.2–5.4)
RBC #/AREA URNS HPF: ABNORMAL /HPF (ref 0–2)
SODIUM SERPL-SCNC: 139 MMOL/L (ref 136–145)
SP GR UR STRIP.AUTO: 1 (ref 1–1.03)
SQUAMOUS #/AREA URNS HPF: ABNORMAL /HPF
TRIGL SERPL-MCNC: 172 MG/DL (ref 0–149)
TSH SERPL DL<=0.005 MIU/L-ACNC: 1.28 UIU/ML (ref 0.35–5.5)
UROBILINOGEN UR STRIP.AUTO-MCNC: 0.2 E.U./DL
WBC # BLD AUTO: 10.3 K/UL (ref 4.8–10.8)
WBC #/AREA URNS HPF: ABNORMAL /HPF (ref 0–5)

## 2023-09-22 RX ORDER — VENLAFAXINE HYDROCHLORIDE 37.5 MG/1
37.5 CAPSULE, EXTENDED RELEASE ORAL EVERY OTHER DAY
Qty: 45 CAPSULE | Refills: 3 | Status: SHIPPED | OUTPATIENT
Start: 2023-09-22

## 2023-09-22 RX ORDER — BUPROPION HYDROCHLORIDE 75 MG/1
TABLET ORAL
Qty: 60 TABLET | Refills: 2
Start: 2023-09-22

## 2023-09-22 SDOH — ECONOMIC STABILITY: INCOME INSECURITY: HOW HARD IS IT FOR YOU TO PAY FOR THE VERY BASICS LIKE FOOD, HOUSING, MEDICAL CARE, AND HEATING?: NOT HARD AT ALL

## 2023-09-22 SDOH — ECONOMIC STABILITY: FOOD INSECURITY: WITHIN THE PAST 12 MONTHS, THE FOOD YOU BOUGHT JUST DIDN'T LAST AND YOU DIDN'T HAVE MONEY TO GET MORE.: NEVER TRUE

## 2023-09-22 SDOH — ECONOMIC STABILITY: FOOD INSECURITY: WITHIN THE PAST 12 MONTHS, YOU WORRIED THAT YOUR FOOD WOULD RUN OUT BEFORE YOU GOT MONEY TO BUY MORE.: NEVER TRUE

## 2023-09-22 SDOH — ECONOMIC STABILITY: HOUSING INSECURITY
IN THE LAST 12 MONTHS, WAS THERE A TIME WHEN YOU DID NOT HAVE A STEADY PLACE TO SLEEP OR SLEPT IN A SHELTER (INCLUDING NOW)?: NO

## 2023-09-22 ASSESSMENT — ENCOUNTER SYMPTOMS
CONSTIPATION: 1
BACK PAIN: 1
RESPIRATORY NEGATIVE: 1

## 2023-09-22 ASSESSMENT — PATIENT HEALTH QUESTIONNAIRE - PHQ9
6. FEELING BAD ABOUT YOURSELF - OR THAT YOU ARE A FAILURE OR HAVE LET YOURSELF OR YOUR FAMILY DOWN: 0
4. FEELING TIRED OR HAVING LITTLE ENERGY: 0
1. LITTLE INTEREST OR PLEASURE IN DOING THINGS: 0
9. THOUGHTS THAT YOU WOULD BE BETTER OFF DEAD, OR OF HURTING YOURSELF: 0
8. MOVING OR SPEAKING SO SLOWLY THAT OTHER PEOPLE COULD HAVE NOTICED. OR THE OPPOSITE, BEING SO FIGETY OR RESTLESS THAT YOU HAVE BEEN MOVING AROUND A LOT MORE THAN USUAL: 0
7. TROUBLE CONCENTRATING ON THINGS, SUCH AS READING THE NEWSPAPER OR WATCHING TELEVISION: 0
SUM OF ALL RESPONSES TO PHQ QUESTIONS 1-9: 0
SUM OF ALL RESPONSES TO PHQ QUESTIONS 1-9: 0
3. TROUBLE FALLING OR STAYING ASLEEP: 0
5. POOR APPETITE OR OVEREATING: 0
10. IF YOU CHECKED OFF ANY PROBLEMS, HOW DIFFICULT HAVE THESE PROBLEMS MADE IT FOR YOU TO DO YOUR WORK, TAKE CARE OF THINGS AT HOME, OR GET ALONG WITH OTHER PEOPLE: 0
SUM OF ALL RESPONSES TO PHQ QUESTIONS 1-9: 0
SUM OF ALL RESPONSES TO PHQ QUESTIONS 1-9: 0
2. FEELING DOWN, DEPRESSED OR HOPELESS: 0
SUM OF ALL RESPONSES TO PHQ9 QUESTIONS 1 & 2: 0

## 2023-09-22 NOTE — PATIENT INSTRUCTIONS
Wellbutrin---TAKE 1 TABLET BY MOUTH EVERY MORNING FOR 2 WEEKS THEN INCREASE TO 1 TWICE DAILY AS DIRECTED              Learning About Lung Cancer Screening  What is screening for lung cancer? Lung cancer screening is a way to find some lung cancers early, before a person has any symptoms of the cancer. Lung cancer screening may help those who have the highest risk for lung cancer--people age 48 and older who are or were heavy smokers. For most people, who aren't at increased risk, screening for lung cancer probably isn't helpful. Screening won't prevent cancer. And it may not find all lung cancers. Lung cancer screening may lower the risk of dying from lung cancer in a small number of people. How is it done? Lung cancer screening is done with a low-dose CT (computed tomography) scan. A CT scan uses X-rays, or radiation, to make detailed pictures of your body. Experts recommend that screening be done in medical centers that focus on finding and treating lung cancer. Who is screening recommended for? Lung cancer screening is recommended for people age 48 and older who are or were heavy smokers. That means people with a smoking history of at least 20 pack years. A pack year is a way to measure how heavy a smoker you are or were. To figure out your pack years, multiply how many packs a day on average (assuming 20 cigarettes per pack) you have smoked by how many years you have smoked. For example: If you smoked 1 pack a day for 20 years, that's 1 times 20. So you have a smoking history of 20 pack years. If you smoked 2 packs a day for 10 years, that's 2 times 10. So you have a smoking history of 20 pack years. Experts agree that screening is for people who have a high risk of lung cancer. But experts don't agree on what high risk means. Some say people age 48 or older with at least a 20-pack-year smoking history are high risk. Others say it's people age 54 or older with a 30-pack-year history.   To see if you

## 2023-09-22 NOTE — PROGRESS NOTES
SUBJECTIVE:    Patient ID: Tania Santos is a50 y.o. female. Tania Santos is here today for Annual Exam (Patient presents for physical exam.) and Discuss Medications (Patient is honest about not taking her medication as prescribed.)  . HPI:   HPI     Here today for yearly physical.     Pt states that just so happened she changed plan with effexor. Since no PMS, has found she can take effexor every 3 days. Feels that her IBS sx are more manageable with effexor on board. Had been given wellbutrin previously but anxiety seemed to increase at the time. She is interested in stopping smoking and would like to stop. From 2ppd to approx 1ppd. Has been eval by GI and continues Linzess in dose variation. Works well. Low back pain for years. Wants to do PT              Past Medical History:   Diagnosis Date    Allergic rhinitis     Anxiety     Depression     Mitral valve prolapse      Prior to Visit Medications    Medication Sig Taking?  Authorizing Provider   linaCLOtide (LINZESS) 72 MCG CAPS capsule Take 1 capsule by mouth every morning (before breakfast) Alternates with 145mg depending on bowel movements Yes Historical Provider, MD   venlafaxine (EFFEXOR XR) 37.5 MG extended release capsule Take 1 capsule by mouth every other day Yes TINO Peralta   buPROPion (WELLBUTRIN) 75 MG tablet TAKE 1 TABLET BY MOUTH EVERY MORNING FOR 2 WEEKS THEN INCREASE TO 1 TWICE DAILY AS DIRECTED Yes TINO Peralta   linaclotide (LINZESS) 145 MCG capsule Take 1 capsule by mouth every morning (before breakfast) Yes TINO Frances NP   famotidine (PEPCID) 20 MG tablet Take 1 tablet by mouth nightly  Patient not taking: Reported on 9/22/2023  TINO Frances NP   CRANBERRY PO Take by mouth as needed   Patient not taking: Reported on 9/22/2023  Historical Provider, MD   NASONEX 50 MCG/ACT nasal spray INSTILL 2 SPRAYS IN [facility] NOSTIL DAILY  Patient taking differently: as needed  Flor Wise

## 2023-09-28 ENCOUNTER — HOSPITAL ENCOUNTER (OUTPATIENT)
Dept: WOMENS IMAGING | Age: 53
Discharge: HOME | End: 2023-09-28
Payer: COMMERCIAL

## 2023-09-28 DIAGNOSIS — Z12.31 SCREENING MAMMOGRAM, ENCOUNTER FOR: ICD-10-CM

## 2023-09-28 PROCEDURE — 77063 BREAST TOMOSYNTHESIS BI: CPT

## 2023-10-03 ENCOUNTER — E-VISIT (OUTPATIENT)
Dept: FAMILY MEDICINE CLINIC | Age: 53
End: 2023-10-03
Payer: COMMERCIAL

## 2023-10-03 DIAGNOSIS — R39.9 UTI SYMPTOMS: Primary | ICD-10-CM

## 2023-10-03 PROCEDURE — 99421 OL DIG E/M SVC 5-10 MIN: CPT | Performed by: FAMILY MEDICINE

## 2023-10-03 RX ORDER — NITROFURANTOIN 25; 75 MG/1; MG/1
100 CAPSULE ORAL 2 TIMES DAILY
Qty: 20 CAPSULE | Refills: 0 | Status: SHIPPED | OUTPATIENT
Start: 2023-10-03 | End: 2023-10-13

## 2023-10-03 NOTE — PROGRESS NOTES
HPI: as per patient provided history  Exam: N/A (electronic visit)  ASSESSMENT/PLAN:   Diagnosis Orders   1. UTI symptoms  nitrofurantoin, macrocrystal-monohydrate, (MACROBID) 100 MG capsule        UTI symptoms: Treat with Macrobid    Patient instructed to call the office if worsens, or fails to improve as anticipated. 5-10 minutes were spent on the digital evaluation and management of this patient.

## 2023-11-17 ENCOUNTER — PATIENT MESSAGE (OUTPATIENT)
Dept: FAMILY MEDICINE CLINIC | Age: 53
End: 2023-11-17

## 2023-11-20 NOTE — TELEPHONE ENCOUNTER
From: Silver Jimenes  To: Violetta Fenton  Sent: 11/17/2023 12:50 PM CST  Subject: Current blood, increase Wellbutrin? I wanted to know if the lifestyle changes were helping so I did another test.   Cholesterol-206  Triglycerides-84  Lipoproteins-2  HDL-62  LDL-129  HDL % of total-30.1     Seems to be going in right direction. Now for smoking. May I increase Wellbutrin? I have added Anni Loco, Coq10.

## 2023-11-21 RX ORDER — BUPROPION HYDROCHLORIDE 150 MG/1
150 TABLET ORAL EVERY MORNING
Qty: 30 TABLET | Refills: 3 | Status: SHIPPED | OUTPATIENT
Start: 2023-11-21

## 2023-12-12 ENCOUNTER — E-VISIT (OUTPATIENT)
Dept: INTERNAL MEDICINE | Age: 53
End: 2023-12-12
Payer: COMMERCIAL

## 2023-12-12 DIAGNOSIS — R30.0 DYSURIA: Primary | ICD-10-CM

## 2023-12-12 DIAGNOSIS — R30.0 DYSURIA: ICD-10-CM

## 2023-12-12 LAB
BACTERIA #/AREA URNS HPF: ABNORMAL /HPF
BILIRUB UR QL STRIP: NEGATIVE
CLARITY UR: CLEAR
COLOR UR: YELLOW
GLUCOSE UR STRIP.AUTO-MCNC: NEGATIVE MG/DL
HGB UR STRIP.AUTO-MCNC: ABNORMAL MG/L
KETONES UR STRIP.AUTO-MCNC: NEGATIVE MG/DL
LEUKOCYTE ESTERASE UR QL STRIP.AUTO: ABNORMAL
NITRITE UR QL STRIP.AUTO: NEGATIVE
PH UR STRIP.AUTO: 6 [PH] (ref 5–8)
PROT UR STRIP.AUTO-MCNC: NEGATIVE MG/DL
RBC #/AREA URNS HPF: ABNORMAL /HPF (ref 0–2)
SP GR UR STRIP.AUTO: 1 (ref 1–1.03)
SQUAMOUS #/AREA URNS HPF: ABNORMAL /HPF
UROBILINOGEN UR STRIP.AUTO-MCNC: 0.2 E.U./DL
WBC #/AREA URNS HPF: ABNORMAL /HPF (ref 0–5)

## 2023-12-12 PROCEDURE — 99421 OL DIG E/M SVC 5-10 MIN: CPT | Performed by: INTERNAL MEDICINE

## 2023-12-12 RX ORDER — PHENAZOPYRIDINE HYDROCHLORIDE 200 MG/1
200 TABLET, FILM COATED ORAL 3 TIMES DAILY PRN
Qty: 10 TABLET | Refills: 0 | Status: SHIPPED | OUTPATIENT
Start: 2023-12-12 | End: 2023-12-15

## 2023-12-12 RX ORDER — NITROFURANTOIN 25; 75 MG/1; MG/1
100 CAPSULE ORAL 2 TIMES DAILY
Qty: 20 CAPSULE | Refills: 0 | Status: SHIPPED | OUTPATIENT
Start: 2023-12-12 | End: 2023-12-22

## 2023-12-12 NOTE — PROGRESS NOTES
Submitted an e-visit for possible urinary tract infection. Patient advised to give us a urinalysis. Macrobid and Pyridium called into pharmacy. Patient advised if her symptoms do not get better, to contact Ms. Tu Fortune for further evaluation and treatment. About 5 minutes spent on e-visit.     Electronically signed by Delia Holbrook MD on 12/12/2023 at 2:55 PM

## 2023-12-14 LAB — BACTERIA UR CULT: NORMAL

## 2023-12-28 NOTE — TELEPHONE ENCOUNTER
[place] [facility] called to request a refill on her medication.       Last office visit : 9/22/2023   Next office visit : Visit date not found     Requested Prescriptions     Pending Prescriptions Disp Refills    buPROPion (WELLBUTRIN XL) 150 MG extended release tablet 30 tablet 3     Sig: Take 1 tablet by mouth every morning            Rachel Faust MA

## 2023-12-29 RX ORDER — BUPROPION HYDROCHLORIDE 150 MG/1
150 TABLET ORAL EVERY MORNING
Qty: 30 TABLET | Refills: 3 | Status: SHIPPED | OUTPATIENT
Start: 2023-12-29

## 2024-01-12 DIAGNOSIS — Z72.0 TOBACCO ABUSE: ICD-10-CM

## 2024-01-12 NOTE — TELEPHONE ENCOUNTER
Ritu Hilton called to request a refill on her medication.      Last office visit : 9/22/2023   Next office visit : Visit date not found     Requested Prescriptions     Pending Prescriptions Disp Refills    buPROPion (WELLBUTRIN) 75 MG tablet [Pharmacy Med Name: buPROPion HCl 75 MG Oral Tablet] 14 tablet 0     Sig: TAKE 1 TABLET Y MOUTH EVERY MORNING FOR 2 WEEKS THEN INCREASE TO 1 TABLET BY MOUTH TWICE DAILY AS DIRECTED            Gloria Cat MA

## 2024-01-16 RX ORDER — BUPROPION HYDROCHLORIDE 75 MG/1
TABLET ORAL
Qty: 14 TABLET | Refills: 0 | OUTPATIENT
Start: 2024-01-16

## 2024-01-22 NOTE — TELEPHONE ENCOUNTER
Ritu Hilton called to request a refill on her medication.      Last office visit : 9/22/2023   Next office visit : Visit date not found     Requested Prescriptions     Pending Prescriptions Disp Refills    buPROPion (WELLBUTRIN XL) 150 MG extended release tablet 30 tablet 3     Sig: Take 1 tablet by mouth every morning            Gloria Cat MA

## 2024-01-26 RX ORDER — BUPROPION HYDROCHLORIDE 150 MG/1
150 TABLET ORAL EVERY MORNING
Qty: 30 TABLET | Refills: 3 | Status: SHIPPED | OUTPATIENT
Start: 2024-01-26

## 2024-05-03 RX ORDER — BUPROPION HYDROCHLORIDE 150 MG/1
150 TABLET ORAL EVERY MORNING
Qty: 30 TABLET | Refills: 3 | Status: SHIPPED | OUTPATIENT
Start: 2024-05-03

## 2024-05-03 NOTE — TELEPHONE ENCOUNTER
PHARMACY called to request a refill on her medication.      Last office visit : 9/22/2023   Next office visit : Visit date not found     Requested Prescriptions     Pending Prescriptions Disp Refills    buPROPion (WELLBUTRIN XL) 150 MG extended release tablet 30 tablet 3     Sig: Take 1 tablet by mouth every morning            Gloria Cat MA, CCMA

## 2024-05-30 ENCOUNTER — TELEPHONE (OUTPATIENT)
Dept: FAMILY MEDICINE CLINIC | Age: 54
End: 2024-05-30

## 2024-05-30 NOTE — TELEPHONE ENCOUNTER
Patient doesn't want medications to go to [facility] due to insurance preference.    Patient is needing the dose of Wellbutrin that is for quitting smoking and she thinks that its the 150 mg but it needs to say twice a day and the Rx was written for once a day. Please clarify.    Does she need a video visit or in person visit since its been over 6 months?

## 2024-06-03 NOTE — TELEPHONE ENCOUNTER
I would like to speak with her about smoking cessation success.  We will also talk about dosages.  This visit can be a virtual if she prefers.

## 2024-06-04 NOTE — TELEPHONE ENCOUNTER
Attempted to reach patient at phone number on file, no answer. Left message for patient to call back regarding provider notes.

## 2024-06-07 ENCOUNTER — TELEMEDICINE (OUTPATIENT)
Dept: FAMILY MEDICINE CLINIC | Age: 54
End: 2024-06-07
Payer: COMMERCIAL

## 2024-06-07 DIAGNOSIS — Z72.0 TOBACCO ABUSE: Primary | ICD-10-CM

## 2024-06-07 PROCEDURE — 99213 OFFICE O/P EST LOW 20 MIN: CPT | Performed by: NURSE PRACTITIONER

## 2024-06-07 RX ORDER — BUPROPION HYDROCHLORIDE 150 MG/1
TABLET, FILM COATED, EXTENDED RELEASE ORAL
Qty: 60 TABLET | Refills: 2 | Status: SHIPPED | OUTPATIENT
Start: 2024-06-07

## 2024-06-07 ASSESSMENT — ENCOUNTER SYMPTOMS: RESPIRATORY NEGATIVE: 1

## 2024-06-07 NOTE — PROGRESS NOTES
2024    TELEHEALTH EVALUATION -- Audio/Visual (During COVID-19 public health emergency)    Chief Complaint   Patient presents with    Medication Refill    Nicotine Dependence           Ritu Montez (:  1970) has requested an audio/video evaluation for the following concern(s):  HPI:  States years ago she was on Effexor and with this timeframe now she doesn't feel it is as effective as it was decades ago.  She is currently stating only taking it every 3d now.  We will revisit stopping this in future after she stops smoking.  Overall, she feels she is doing well.   She states that bupropion \"has been a fiasco\"----referring to obtaining it.   She states she has a lot of the IR bupropion.    She is interested in using this for smoking cessation.  Has also noted improvement in attention span when she has taken it in past.     Review of Systems   Constitutional: Negative.    Respiratory: Negative.     Cardiovascular: Negative.    Psychiatric/Behavioral:  Positive for decreased concentration. The patient is nervous/anxious.        Prior to Visit Medications    Medication Sig Taking? Authorizing Provider   buPROPion (ZYBAN) 150 MG extended release tablet 1 po every AM for 1wk then increase to 1 po twice daily Yes Violetta Fenton APRN   linaCLOtide (LINZESS) 72 MCG CAPS capsule Take 1 capsule by mouth every morning (before breakfast) Alternates with 145mg depending on bowel movements  Provider, MD Dorota   venlafaxine (EFFEXOR XR) 37.5 MG extended release capsule Take 1 capsule by mouth every other day  Violetta Fenton APRN   linaclotide (LINZESS) 145 MCG capsule Take 1 capsule by mouth every morning (before breakfast)  Dayna Garcia APRN - NP       Social History     Tobacco Use    Smoking status: Every Day     Current packs/day: 1.00     Average packs/day: 1 pack/day for 35.0 years (35.0 ttl pk-yrs)     Types: Cigarettes     Start date: 1989    Smokeless tobacco: Never   Vaping Use    Vaping Use:

## 2024-09-16 ENCOUNTER — OFFICE VISIT (OUTPATIENT)
Dept: FAMILY MEDICINE CLINIC | Age: 54
End: 2024-09-16
Payer: COMMERCIAL

## 2024-09-16 VITALS
DIASTOLIC BLOOD PRESSURE: 74 MMHG | TEMPERATURE: 98.1 F | HEART RATE: 66 BPM | BODY MASS INDEX: 24.9 KG/M2 | SYSTOLIC BLOOD PRESSURE: 122 MMHG | WEIGHT: 159 LBS | OXYGEN SATURATION: 98 %

## 2024-09-16 DIAGNOSIS — K59.09 CHRONIC CONSTIPATION: ICD-10-CM

## 2024-09-16 DIAGNOSIS — Z72.0 TOBACCO ABUSE: ICD-10-CM

## 2024-09-16 DIAGNOSIS — F32.A ANXIETY AND DEPRESSION: ICD-10-CM

## 2024-09-16 DIAGNOSIS — Z00.00 ANNUAL PHYSICAL EXAM: Primary | ICD-10-CM

## 2024-09-16 DIAGNOSIS — Z00.00 ANNUAL PHYSICAL EXAM: ICD-10-CM

## 2024-09-16 DIAGNOSIS — Z12.31 SCREENING MAMMOGRAM, ENCOUNTER FOR: ICD-10-CM

## 2024-09-16 DIAGNOSIS — Z01.419 PAP SMEAR, AS PART OF ROUTINE GYNECOLOGICAL EXAMINATION: ICD-10-CM

## 2024-09-16 DIAGNOSIS — Z87.891 PERSONAL HISTORY OF TOBACCO USE: ICD-10-CM

## 2024-09-16 DIAGNOSIS — R23.9 SKIN CHANGE: ICD-10-CM

## 2024-09-16 DIAGNOSIS — F41.9 ANXIETY AND DEPRESSION: ICD-10-CM

## 2024-09-16 LAB
25(OH)D3 SERPL-MCNC: 40.7 NG/ML
ALBUMIN SERPL-MCNC: 4.5 G/DL (ref 3.5–5.2)
ALP SERPL-CCNC: 51 U/L (ref 35–104)
ALT SERPL-CCNC: 9 U/L (ref 5–33)
ANION GAP SERPL CALCULATED.3IONS-SCNC: 11 MMOL/L (ref 7–19)
AST SERPL-CCNC: 15 U/L (ref 5–32)
BACTERIA URNS QL MICRO: NEGATIVE /HPF
BASOPHILS # BLD: 0 K/UL (ref 0–0.2)
BASOPHILS NFR BLD: 0.6 % (ref 0–1)
BILIRUB SERPL-MCNC: 0.3 MG/DL (ref 0.2–1.2)
BILIRUB UR QL STRIP: NEGATIVE
BUN SERPL-MCNC: 12 MG/DL (ref 6–20)
CALCIUM SERPL-MCNC: 9.1 MG/DL (ref 8.6–10)
CHLORIDE SERPL-SCNC: 104 MMOL/L (ref 98–111)
CHOLEST SERPL-MCNC: 201 MG/DL (ref 0–199)
CLARITY UR: CLEAR
CO2 SERPL-SCNC: 24 MMOL/L (ref 22–29)
COLOR UR: YELLOW
CREAT SERPL-MCNC: 0.7 MG/DL (ref 0.5–0.9)
CRYSTALS URNS MICRO: NORMAL /HPF
EOSINOPHIL # BLD: 0.3 K/UL (ref 0–0.6)
EOSINOPHIL NFR BLD: 4.2 % (ref 0–5)
EPI CELLS #/AREA URNS AUTO: 4 /HPF (ref 0–5)
ERYTHROCYTE [DISTWIDTH] IN BLOOD BY AUTOMATED COUNT: 12.6 % (ref 11.5–14.5)
GLUCOSE SERPL-MCNC: 99 MG/DL (ref 70–99)
GLUCOSE UR STRIP.AUTO-MCNC: NEGATIVE MG/DL
HCT VFR BLD AUTO: 43.5 % (ref 37–47)
HDLC SERPL-MCNC: 71 MG/DL (ref 40–60)
HGB BLD-MCNC: 14.6 G/DL (ref 12–16)
HGB UR STRIP.AUTO-MCNC: NEGATIVE MG/L
HYALINE CASTS #/AREA URNS AUTO: 4 /HPF (ref 0–8)
IMM GRANULOCYTES # BLD: 0 K/UL
KETONES UR STRIP.AUTO-MCNC: NEGATIVE MG/DL
LDLC SERPL CALC-MCNC: 108 MG/DL
LEUKOCYTE ESTERASE UR QL STRIP.AUTO: ABNORMAL
LYMPHOCYTES # BLD: 2.3 K/UL (ref 1.1–4.5)
LYMPHOCYTES NFR BLD: 33.6 % (ref 20–40)
MCH RBC QN AUTO: 31.9 PG (ref 27–31)
MCHC RBC AUTO-ENTMCNC: 33.6 G/DL (ref 33–37)
MCV RBC AUTO: 95.2 FL (ref 81–99)
MONOCYTES # BLD: 0.4 K/UL (ref 0–0.9)
MONOCYTES NFR BLD: 5.1 % (ref 0–10)
NEUTROPHILS # BLD: 3.9 K/UL (ref 1.5–7.5)
NEUTS SEG NFR BLD: 56.2 % (ref 50–65)
NITRITE UR QL STRIP.AUTO: NEGATIVE
PH UR STRIP.AUTO: 6 [PH] (ref 5–8)
PLATELET # BLD AUTO: 203 K/UL (ref 130–400)
PMV BLD AUTO: 11.7 FL (ref 9.4–12.3)
POTASSIUM SERPL-SCNC: 3.9 MMOL/L (ref 3.5–5)
PROT SERPL-MCNC: 7.1 G/DL (ref 6.4–8.3)
PROT UR STRIP.AUTO-MCNC: NEGATIVE MG/DL
RBC # BLD AUTO: 4.57 M/UL (ref 4.2–5.4)
RBC #/AREA URNS AUTO: 1 /HPF (ref 0–4)
SODIUM SERPL-SCNC: 139 MMOL/L (ref 136–145)
SP GR UR STRIP.AUTO: 1 (ref 1–1.03)
TRIGL SERPL-MCNC: 110 MG/DL (ref 0–149)
TSH SERPL DL<=0.005 MIU/L-ACNC: 1.53 UIU/ML (ref 0.27–4.2)
UROBILINOGEN UR STRIP.AUTO-MCNC: 0.2 E.U./DL
WBC # BLD AUTO: 6.9 K/UL (ref 4.8–10.8)
WBC #/AREA URNS AUTO: 1 /HPF (ref 0–5)

## 2024-09-16 PROCEDURE — 99396 PREV VISIT EST AGE 40-64: CPT | Performed by: NURSE PRACTITIONER

## 2024-09-16 PROCEDURE — G0296 VISIT TO DETERM LDCT ELIG: HCPCS | Performed by: NURSE PRACTITIONER

## 2024-09-16 RX ORDER — VENLAFAXINE HYDROCHLORIDE 37.5 MG/1
37.5 CAPSULE, EXTENDED RELEASE ORAL EVERY OTHER DAY
Qty: 45 CAPSULE | Refills: 3 | Status: SHIPPED | OUTPATIENT
Start: 2024-09-16

## 2024-09-16 RX ORDER — BUPROPION HYDROCHLORIDE 150 MG/1
TABLET, FILM COATED, EXTENDED RELEASE ORAL
Qty: 60 TABLET | Refills: 2 | Status: SHIPPED | OUTPATIENT
Start: 2024-09-16

## 2024-09-16 ASSESSMENT — PATIENT HEALTH QUESTIONNAIRE - PHQ9
8. MOVING OR SPEAKING SO SLOWLY THAT OTHER PEOPLE COULD HAVE NOTICED. OR THE OPPOSITE, BEING SO FIGETY OR RESTLESS THAT YOU HAVE BEEN MOVING AROUND A LOT MORE THAN USUAL: NOT AT ALL
1. LITTLE INTEREST OR PLEASURE IN DOING THINGS: NOT AT ALL
SUM OF ALL RESPONSES TO PHQ QUESTIONS 1-9: 1
SUM OF ALL RESPONSES TO PHQ QUESTIONS 1-9: 1
4. FEELING TIRED OR HAVING LITTLE ENERGY: NOT AT ALL
9. THOUGHTS THAT YOU WOULD BE BETTER OFF DEAD, OR OF HURTING YOURSELF: NOT AT ALL
5. POOR APPETITE OR OVEREATING: SEVERAL DAYS
2. FEELING DOWN, DEPRESSED OR HOPELESS: NOT AT ALL
SUM OF ALL RESPONSES TO PHQ QUESTIONS 1-9: 1
6. FEELING BAD ABOUT YOURSELF - OR THAT YOU ARE A FAILURE OR HAVE LET YOURSELF OR YOUR FAMILY DOWN: NOT AT ALL
10. IF YOU CHECKED OFF ANY PROBLEMS, HOW DIFFICULT HAVE THESE PROBLEMS MADE IT FOR YOU TO DO YOUR WORK, TAKE CARE OF THINGS AT HOME, OR GET ALONG WITH OTHER PEOPLE: NOT DIFFICULT AT ALL
SUM OF ALL RESPONSES TO PHQ9 QUESTIONS 1 & 2: 0
SUM OF ALL RESPONSES TO PHQ QUESTIONS 1-9: 1
7. TROUBLE CONCENTRATING ON THINGS, SUCH AS READING THE NEWSPAPER OR WATCHING TELEVISION: NOT AT ALL
3. TROUBLE FALLING OR STAYING ASLEEP: NOT AT ALL

## 2024-09-16 ASSESSMENT — ENCOUNTER SYMPTOMS
COLOR CHANGE: 1
RESPIRATORY NEGATIVE: 1
CONSTIPATION: 1

## 2024-09-19 LAB
HPV HR 12 DNA SPEC QL NAA+PROBE: NOT DETECTED
HPV16 DNA SPEC QL NAA+PROBE: NOT DETECTED
HPV16+18+H RISK 12 DNA SPEC-IMP: NORMAL
HPV18 DNA SPEC QL NAA+PROBE: NOT DETECTED

## 2024-09-27 ENCOUNTER — HOSPITAL ENCOUNTER (OUTPATIENT)
Dept: WOMENS IMAGING | Age: 54
Discharge: HOME | End: 2024-09-27
Payer: COMMERCIAL

## 2024-09-27 DIAGNOSIS — Z12.31 SCREENING MAMMOGRAM, ENCOUNTER FOR: ICD-10-CM

## 2024-09-27 PROCEDURE — 77063 BREAST TOMOSYNTHESIS BI: CPT

## 2024-10-01 ENCOUNTER — TELEPHONE (OUTPATIENT)
Dept: OTHER | Age: 54
End: 2024-10-01

## 2024-10-01 NOTE — TELEPHONE ENCOUNTER
TINO Peralta ordered smoking cessation referral for patient due to nicotine dependence.  Offered smoking cessation class beginning in October.  Left message with all pertinent information provided.  Pt text while leaving message and asked her if she was interested and needed more information.  No response.

## 2024-12-02 ENCOUNTER — TELEMEDICINE (OUTPATIENT)
Dept: FAMILY MEDICINE CLINIC | Age: 54
End: 2024-12-02
Payer: COMMERCIAL

## 2024-12-02 DIAGNOSIS — B96.89 ACUTE BACTERIAL SINUSITIS: Primary | ICD-10-CM

## 2024-12-02 DIAGNOSIS — J01.90 ACUTE BACTERIAL SINUSITIS: Primary | ICD-10-CM

## 2024-12-02 PROCEDURE — 99213 OFFICE O/P EST LOW 20 MIN: CPT | Performed by: NURSE PRACTITIONER

## 2024-12-02 RX ORDER — BENZONATATE 200 MG/1
200 CAPSULE ORAL 3 TIMES DAILY PRN
Qty: 30 CAPSULE | Refills: 0 | Status: SHIPPED | OUTPATIENT
Start: 2024-12-02 | End: 2024-12-09

## 2024-12-02 RX ORDER — PREDNISONE 20 MG/1
20 TABLET ORAL DAILY
Qty: 5 TABLET | Refills: 0 | Status: SHIPPED | OUTPATIENT
Start: 2024-12-02 | End: 2024-12-07

## 2024-12-02 RX ORDER — CEFDINIR 300 MG/1
300 CAPSULE ORAL 2 TIMES DAILY
Qty: 20 CAPSULE | Refills: 0 | Status: SHIPPED | OUTPATIENT
Start: 2024-12-02 | End: 2024-12-12

## 2024-12-02 RX ORDER — FLUCONAZOLE 150 MG/1
150 TABLET ORAL
Qty: 2 TABLET | Refills: 0 | Status: SHIPPED | OUTPATIENT
Start: 2024-12-02 | End: 2024-12-08

## 2024-12-02 SDOH — ECONOMIC STABILITY: FOOD INSECURITY: WITHIN THE PAST 12 MONTHS, THE FOOD YOU BOUGHT JUST DIDN'T LAST AND YOU DIDN'T HAVE MONEY TO GET MORE.: NEVER TRUE

## 2024-12-02 SDOH — ECONOMIC STABILITY: INCOME INSECURITY: HOW HARD IS IT FOR YOU TO PAY FOR THE VERY BASICS LIKE FOOD, HOUSING, MEDICAL CARE, AND HEATING?: NOT VERY HARD

## 2024-12-02 SDOH — ECONOMIC STABILITY: FOOD INSECURITY: WITHIN THE PAST 12 MONTHS, YOU WORRIED THAT YOUR FOOD WOULD RUN OUT BEFORE YOU GOT MONEY TO BUY MORE.: NEVER TRUE

## 2024-12-02 ASSESSMENT — ENCOUNTER SYMPTOMS
GASTROINTESTINAL NEGATIVE: 1
COUGH: 1

## 2024-12-02 NOTE — PROGRESS NOTES
2024    TELEHEALTH EVALUATION -- Audio/Visual (During COVID-19 public health emergency)    Chief Complaint   Patient presents with    Cough    Congestion     Cough, x5-6 days. Congestion, 1-2 weeks. Patient states she has been having a low grade fever over the weekend, no fever today.            Ritu Montez (:  1970) has requested an audio/video evaluation for the following concern(s):  HPI:  Onset of illness was approx 10days ago.  Began with matted red eyes and that has improved. Sinus pressure then followed.  The last 5days she was getting chills and reports having low grade fever.  Tx-water intake, slept, dayquil  No fever in the last 2-3days.  Cough began 3-4d ago and is dry.  Sore throat and headache  Reports fall allergies are her worst.      Review of Systems   Constitutional:  Positive for fever.   HENT:  Positive for congestion.    Respiratory:  Positive for cough.    Gastrointestinal: Negative.        Prior to Visit Medications    Medication Sig Taking? Authorizing Provider   cefdinir (OMNICEF) 300 MG capsule Take 1 capsule by mouth 2 times daily for 10 days Yes Violetta Fenton APRN   predniSONE (DELTASONE) 20 MG tablet Take 1 tablet by mouth daily for 5 days Yes Violetta Fenton APRN   fluconazole (DIFLUCAN) 150 MG tablet Take 1 tablet by mouth every 72 hours for 6 days For yeast infection Yes Violetta Fenton APRN   benzonatate (TESSALON) 200 MG capsule Take 1 capsule by mouth 3 times daily as needed for Cough Yes Violetta Fenton APRN   linaCLOtide (LINZESS) 72 MCG CAPS capsule Take 1 capsule by mouth every morning (before breakfast) Alternates with 145mg depending on bowel movements Yes Violetta Fenton APRN   linaclotide (LINZESS) 145 MCG capsule Take 1 capsule by mouth every morning (before breakfast) Yes Violetta Fenton APRN   venlafaxine (EFFEXOR XR) 37.5 MG extended release capsule Take 1 capsule by mouth every other day Yes Violetta Fenton APRN   buPROPion (ZYBAN) 150 MG extended release tablet 1 po

## 2025-03-24 ENCOUNTER — TELEMEDICINE (OUTPATIENT)
Age: 55
End: 2025-03-24
Payer: COMMERCIAL

## 2025-03-24 DIAGNOSIS — M51.360 DEGENERATION OF INTERVERTEBRAL DISC OF LUMBAR REGION WITH DISCOGENIC BACK PAIN: Primary | ICD-10-CM

## 2025-03-24 PROCEDURE — 99212 OFFICE O/P EST SF 10 MIN: CPT | Performed by: NURSE PRACTITIONER

## 2025-03-24 RX ORDER — DICLOFENAC SODIUM 75 MG/1
75 TABLET, DELAYED RELEASE ORAL 2 TIMES DAILY PRN
Qty: 60 TABLET | Refills: 0 | Status: SHIPPED | OUTPATIENT
Start: 2025-03-24

## 2025-03-24 SDOH — ECONOMIC STABILITY: FOOD INSECURITY: WITHIN THE PAST 12 MONTHS, YOU WORRIED THAT YOUR FOOD WOULD RUN OUT BEFORE YOU GOT MONEY TO BUY MORE.: NEVER TRUE

## 2025-03-24 SDOH — ECONOMIC STABILITY: FOOD INSECURITY: WITHIN THE PAST 12 MONTHS, THE FOOD YOU BOUGHT JUST DIDN'T LAST AND YOU DIDN'T HAVE MONEY TO GET MORE.: NEVER TRUE

## 2025-03-24 ASSESSMENT — ENCOUNTER SYMPTOMS
RESPIRATORY NEGATIVE: 1
BACK PAIN: 1

## 2025-03-24 ASSESSMENT — PATIENT HEALTH QUESTIONNAIRE - PHQ9
SUM OF ALL RESPONSES TO PHQ QUESTIONS 1-9: 0
7. TROUBLE CONCENTRATING ON THINGS, SUCH AS READING THE NEWSPAPER OR WATCHING TELEVISION: NOT AT ALL
2. FEELING DOWN, DEPRESSED OR HOPELESS: NOT AT ALL
8. MOVING OR SPEAKING SO SLOWLY THAT OTHER PEOPLE COULD HAVE NOTICED. OR THE OPPOSITE, BEING SO FIGETY OR RESTLESS THAT YOU HAVE BEEN MOVING AROUND A LOT MORE THAN USUAL: NOT AT ALL
SUM OF ALL RESPONSES TO PHQ QUESTIONS 1-9: 0
5. POOR APPETITE OR OVEREATING: NOT AT ALL
3. TROUBLE FALLING OR STAYING ASLEEP: NOT AT ALL
4. FEELING TIRED OR HAVING LITTLE ENERGY: NOT AT ALL
SUM OF ALL RESPONSES TO PHQ QUESTIONS 1-9: 0
1. LITTLE INTEREST OR PLEASURE IN DOING THINGS: NOT AT ALL
SUM OF ALL RESPONSES TO PHQ QUESTIONS 1-9: 0
9. THOUGHTS THAT YOU WOULD BE BETTER OFF DEAD, OR OF HURTING YOURSELF: NOT AT ALL
10. IF YOU CHECKED OFF ANY PROBLEMS, HOW DIFFICULT HAVE THESE PROBLEMS MADE IT FOR YOU TO DO YOUR WORK, TAKE CARE OF THINGS AT HOME, OR GET ALONG WITH OTHER PEOPLE: NOT DIFFICULT AT ALL
6. FEELING BAD ABOUT YOURSELF - OR THAT YOU ARE A FAILURE OR HAVE LET YOURSELF OR YOUR FAMILY DOWN: NOT AT ALL

## 2025-03-24 NOTE — PROGRESS NOTES
3/24/2025    TELEHEALTH EVALUATION -- Audio/Visual (During COVID-19 public health emergency)    Chief Complaint   Patient presents with    Back Pain     Back pain/hip pain. X15 years. Referral needed today and would like to discuss the best option.            Ritu Montez (:  1970) has requested an audio/video evaluation for the following concern(s):  HPI:  I communicated with the patient and/or health care decision maker about low back pain and right hip pain.   Details of this discussion including any medical advice provided: see notes    HPI  Pt is doing a video visit today c/o back pain and hip pain.  Intermittent difficulty with video.   Reports onset was approx 15yrs ago.   She is wanting to discuss best options.    Low back pain with right hip pain.   Right gluteal pain into IT band  Xray of lumbar and hip in care everywhere from 2017 out of town facility. Reviewed.     Dennise, PT, has done dry needling and it did help, motrin prn limited to 2 pills once a week to twice a week.   Does see chiro starting now.   Pt interested in dry needling.     Review of Systems   Constitutional: Negative.    Respiratory: Negative.     Cardiovascular: Negative.    Musculoskeletal:  Positive for back pain.   Psychiatric/Behavioral:  The patient is nervous/anxious.        Prior to Visit Medications    Medication Sig Taking? Authorizing Provider   diclofenac (VOLTAREN) 75 MG EC tablet Take 1 tablet by mouth 2 times daily as needed for Pain Joint pain (do not combine with ibuprofen or other anti-inflammatories) Yes Violetta Fenton APRN   tiZANidine (ZANAFLEX) 4 MG tablet Take 0.5-1 tablets by mouth nightly as needed (muscle pain) Yes Violetta Fenton APRLIDYA   linaCLOtide (LINZESS) 72 MCG CAPS capsule Take 1 capsule by mouth every morning (before breakfast) Alternates with 145mg depending on bowel movements Yes Temperance, Violetta, APRN   linaclotide (LINZESS) 145 MCG capsule Take 1 capsule by mouth every morning (before breakfast)

## 2025-03-24 NOTE — PROGRESS NOTES
Ritu Montez is a 55 y.o. female evaluated via telephone on 3/24/2025.      Chief Complaint   Patient presents with    Back Pain     Back pain/hip pain. X15 years. Referral needed today and would like to discuss the best option.            Consent:  She and/or health care decision maker is aware that that she may receive a bill for this telephone service, depending on her insurance coverage, and has provided verbal consent to proceed: Yes      Documentation:  I communicated with the patient and/or health care decision maker about see hpi.   Details of this discussion including any medical advice provided: see notes      I communicated with the patient and/or health care decision maker about low back pain and right hip pain.   Details of this discussion including any medical advice provided: see notes    HPI  Pt is doing a video visit today c/o back pain and hip pain.  Intermittent difficulty with video.   Reports onset was approx 15yrs ago.   She is wanting to discuss best options.    Low back pain with right hip pain.   Right gluteal pain into IT band  Xray of lumbar and hip in care everywhere from 2017 out of town facility. Reviewed.     Dennise, PT, has done dry needling and it did help, motrin prn limited to 2 pills once a week to twice a week.   Does see chiro starting now.   Pt interested in dry needling.             3/24/2025     1:42 PM   Patient-Reported Vitals   Patient-Reported Weight 160   Patient-Reported Height [id number]   Patient-Reported Systolic 125 mmHg   Patient-Reported Diastolic 86 mmHg   Patient-Reported Pulse 86       Diagnosis Orders   1. Degeneration of intervertebral disc of lumbar region with discogenic back pain  External Referral To Chiropractic    diclofenac (VOLTAREN) 75 MG EC tablet    tiZANidine (ZANAFLEX) 4 MG tablet    2017 imaging on file from [facility] in Care Everywhere          I affirm this is a Patient Initiated Episode with an Established Patient who has not had a

## 2025-04-21 ENCOUNTER — PATIENT MESSAGE (OUTPATIENT)
Age: 55
End: 2025-04-21

## 2025-04-21 DIAGNOSIS — M51.360 DEGENERATION OF INTERVERTEBRAL DISC OF LUMBAR REGION WITH DISCOGENIC BACK PAIN: Primary | ICD-10-CM

## 2025-06-17 DIAGNOSIS — Z72.0 TOBACCO ABUSE: ICD-10-CM

## 2025-06-17 RX ORDER — BUPROPION HYDROCHLORIDE 150 MG/1
TABLET, EXTENDED RELEASE ORAL
Qty: 60 TABLET | Refills: 0 | Status: SHIPPED | OUTPATIENT
Start: 2025-06-17

## 2025-06-17 NOTE — TELEPHONE ENCOUNTER
Ritu Hilton called to request a refill on her medication.      Last office visit : 3/24/2025  Next office visit : Visit date not found     Requested Prescriptions     Pending Prescriptions Disp Refills    buPROPion (WELLBUTRIN SR) 150 MG extended release tablet [Pharmacy Med Name: buPROPion HCl ER (SR) 150 MG Oral Tablet Extended Release 12 Hour] 60 tablet 0     Sig: TAKE 1 TABLET BY MOUTH IN THE MORNING FOR 7 DAYS THEN INCREASE  TO  1  TABLET  TWICE  DAILY  THEREAFTER            Selena Patrick LPN

## (undated) DEVICE — ENDO KIT,LOURDES HOSPITAL: Brand: MEDLINE INDUSTRIES, INC.